# Patient Record
Sex: FEMALE | Race: WHITE | NOT HISPANIC OR LATINO | Employment: FULL TIME
[De-identification: names, ages, dates, MRNs, and addresses within clinical notes are randomized per-mention and may not be internally consistent; named-entity substitution may affect disease eponyms.]

---

## 2017-10-30 DIAGNOSIS — Z30.41 ENCOUNTER FOR SURVEILLANCE OF CONTRACEPTIVE PILLS: ICD-10-CM

## 2017-10-31 RX ORDER — NORETHINDRONE ACETATE AND ETHINYL ESTRADIOL .02; 1 MG/1; MG/1
TABLET ORAL
Qty: 84 TABLET | Refills: 0 | Status: SHIPPED | OUTPATIENT
Start: 2017-10-31 | End: 2017-12-01

## 2017-10-31 NOTE — TELEPHONE ENCOUNTER
Medication is being filled for 1 time refill only due to:  Patient needs to be seen because it has been more than one year since last visit. Needs annual visit prior to anymore refills.

## 2017-11-26 ENCOUNTER — HEALTH MAINTENANCE LETTER (OUTPATIENT)
Age: 47
End: 2017-11-26

## 2017-12-01 ENCOUNTER — OFFICE VISIT (OUTPATIENT)
Dept: FAMILY MEDICINE | Facility: CLINIC | Age: 47
End: 2017-12-01
Payer: COMMERCIAL

## 2017-12-01 VITALS
HEART RATE: 72 BPM | HEIGHT: 62 IN | WEIGHT: 172 LBS | RESPIRATION RATE: 20 BRPM | DIASTOLIC BLOOD PRESSURE: 72 MMHG | BODY MASS INDEX: 31.65 KG/M2 | SYSTOLIC BLOOD PRESSURE: 130 MMHG | TEMPERATURE: 98.8 F

## 2017-12-01 DIAGNOSIS — Z30.41 ENCOUNTER FOR SURVEILLANCE OF CONTRACEPTIVE PILLS: ICD-10-CM

## 2017-12-01 DIAGNOSIS — Z12.4 SCREENING FOR CERVICAL CANCER: ICD-10-CM

## 2017-12-01 DIAGNOSIS — Z00.00 ROUTINE GENERAL MEDICAL EXAMINATION AT A HEALTH CARE FACILITY: Primary | ICD-10-CM

## 2017-12-01 DIAGNOSIS — Z12.31 VISIT FOR SCREENING MAMMOGRAM: ICD-10-CM

## 2017-12-01 PROCEDURE — G0202 SCR MAMMO BI INCL CAD: HCPCS | Mod: TC

## 2017-12-01 PROCEDURE — G0145 SCR C/V CYTO,THINLAYER,RESCR: HCPCS | Performed by: PHYSICIAN ASSISTANT

## 2017-12-01 PROCEDURE — 99396 PREV VISIT EST AGE 40-64: CPT | Performed by: PHYSICIAN ASSISTANT

## 2017-12-01 PROCEDURE — 87624 HPV HI-RISK TYP POOLED RSLT: CPT | Performed by: PHYSICIAN ASSISTANT

## 2017-12-01 RX ORDER — NORETHINDRONE ACETATE AND ETHINYL ESTRADIOL .02; 1 MG/1; MG/1
1 TABLET ORAL DAILY
Qty: 84 TABLET | Refills: 3 | Status: SHIPPED | OUTPATIENT
Start: 2017-12-01 | End: 2020-02-20

## 2017-12-01 NOTE — MR AVS SNAPSHOT
After Visit Summary   12/1/2017    Fiona Pollack    MRN: [id number]           Patient Information     Date Of Birth          1970        Visit Information        Provider Department      12/1/2017 11:00 AM Randal Nice PA-C [facility]        Today's Diagnoses     Routine general medical examination at a health care facility        Screening for cervical cancer        Encounter for surveillance of contraceptive pills          Care Instructions      Preventive Health Recommendations  Female Ages 40 to 49    Yearly exam:     See your health care provider every year in order to  1. Review health changes.   2. Discuss preventive care.    3. Review your medicines if your doctor prescribed any.      Get a Pap test every three years (unless you have an abnormal result and your provider advises testing more often).      If you get Pap tests with HPV test, you only need to test every 5 years, unless you have an abnormal result. You do not need a Pap test if your uterus was removed (hysterectomy) and you have not had cancer.      You should be tested each year for STDs (sexually transmitted diseases), if you're at risk.       Ask your doctor if you should have a mammogram.      Have a colonoscopy (test for colon cancer) if someone in your family has had colon cancer or polyps before age 50.       Have a cholesterol test every 5 years.       Have a diabetes test (fasting glucose) after age 45. If you are at risk for diabetes, you should have this test every 3 years.    Shots: Get a flu shot each year. Get a tetanus shot every 10 years.     Nutrition:     Eat at least 5 servings of fruits and vegetables each day.    Eat whole-grain bread, whole-wheat pasta and brown rice instead of white grains and rice.    Talk to your provider about Calcium and Vitamin D.     Lifestyle    Exercise at least 150 minutes a week (an average of 30 minutes a day, 5 days a week). This will help you  control your weight and prevent disease.    Limit alcohol to one drink per day.    No smoking.     Wear sunscreen to prevent skin cancer.    See your dentist every six months for an exam and cleaning.          Follow-ups after your visit        Follow-up notes from your care team     Return in about 1 year (around 12/1/2018) for Physical Exam.      Your next 10 appointments already scheduled     Dec 01, 2017  2:45 PM CST   MAMMOGRAM with CRMA1   [facility]    [address] [place] [id number] [phone]           Do not wear any body powder, lotions, deodorant or perfume the day of the exam. Bring a list of all medications, especially hormones.  If your last mammogram was not done at [facility], please bring your mammogram films. We will need the name of your MD/PA to send a copy of your report.              Who to contact     If you have questions or need follow up information about today's clinic visit or your schedule please contact [facility] directly at [phone].  Normal or non-critical lab and imaging results will be communicated to you by POPSUGARhart, letter or phone within 4 business days after the clinic has received the results. If you do not hear from us within 7 days, please contact the clinic through Kiiot or phone. If you have a critical or abnormal lab result, we will notify you by phone as soon as possible.  Submit refill requests through PeopleGoal or call your pharmacy and they will forward the refill request to us. Please allow 3 business days for your refill to be completed.          Additional Information About Your Visit        PeopleGoal Information     PeopleGoal gives you secure access to your electronic health record. If you see a primary care provider, you can also send messages to your care team and make appointments. If you have questions, please call your primary care clinic.  If you do not have a primary  "care provider, please call [phone] and they will assist you.        Care EveryWhere ID     This is your Care EveryWhere ID. This could be used by other organizations to access your [facility] medical records  [id number]        Your Vitals Were     Pulse Temperature Respirations Height Last Period Breastfeeding?    72 98.8  F (37.1  C) (Oral) 20 5' 2\" (1.575 m) 11/20/2017 (Exact Date) No    BMI (Body Mass Index)                   31.46 kg/m2            Blood Pressure from Last 3 Encounters:   12/01/17 130/72   10/26/16 120/68   10/02/15 118/72    Weight from Last 3 Encounters:   12/01/17 172 lb (78 kg)   10/26/16 174 lb (78.9 kg)   10/02/15 160 lb (72.6 kg)              We Performed the Following     HPV High Risk Types DNA Cervical     Pap imaged thin layer screen with HPV - recommended age 30 - 65 years (select HPV order below)          Today's Medication Changes          These changes are accurate as of: 12/1/17 11:29 AM.  If you have any questions, ask your nurse or doctor.               These medicines have changed or have updated prescriptions.        Dose/Directions    norethindrone-ethinyl estradiol 1-20 MG-MCG per tablet   Commonly known as:  MICROGESTIN 1/20   This may have changed:  See the new instructions.   Used for:  Encounter for surveillance of contraceptive pills   Changed by:  Randal Nice PA-C        Dose:  1 tablet   Take 1 tablet by mouth daily   Quantity:  84 tablet   Refills:  3            Where to get your medicines      These medications were sent to [facility]/pharmacy #[id number] - [place], [place] - [address]  [address]  [facility] [address]     Phone:  [phone]     norethindrone-ethinyl estradiol 1-20 MG-MCG per tablet                Primary Care Provider Office Phone # Fax #    [facility] [phone]       [id number]  [address]  [facility] [id number]        Equal Access to Services     CARMELINA RUBALCAVA AH: daisy Mckinney " be dooleyrudimax prestonmanasa watson. So [facility] [phone].    ATENCIÓN: Si zoila ayers, tiene a nagy disposición servicios gratuitos de asistencia lingüística. Rachidame al [phone].    We comply with applicable federal civil rights laws and [place] laws. We do not discriminate on the basis of race, color, national origin, age, disability, sex, sexual orientation, or gender identity.            Thank you!     Thank you for choosing [facility]  for your care. Our goal is always to provide you with excellent care. Hearing back from our patients is one way we can continue to improve our services. Please take a few minutes to complete the written survey that you may receive in the mail after your visit with us. Thank you!             Your Updated Medication List - Protect others around you: Learn how to safely use, store and throw away your medicines at www.disposemymeds.org.          This list is accurate as of: 12/1/17 11:29 AM.  Always use your most recent med list.                   Brand Name Dispense Instructions for use Diagnosis    norethindrone-ethinyl estradiol 1-20 MG-MCG per tablet    MICROGESTIN 1/20    84 tablet    Take 1 tablet by mouth daily    Encounter for surveillance of contraceptive pills

## 2017-12-01 NOTE — PROGRESS NOTES
SUBJECTIVE:   CC: Fiona Pollack is an 47 year old woman who presents for preventive health visit.     Physical   Annual:     Getting at least 3 servings of Calcium per day::  Yes    Bi-annual eye exam::  Yes    Dental care twice a year::  Yes    Sleep apnea or symptoms of sleep apnea::  None    Diet::  Regular (no restrictions)    Frequency of exercise::  2-3 days/week    Duration of exercise::  15-30 minutes    Taking medications regularly::  Yes    Medication side effects::  None    Additional concerns today::  No    Needs repeat PAP, last year pap normal but +HPV.  No other concerns.  Refill OCPs.    Today's PHQ-2 Score:   PHQ-2 ( 1999 Pf[employer]) 12/1/2017   Q1: Little interest or pleasure in doing things 0   Q2: Feeling down, depressed or hopeless 0   PHQ-2 Score 0   Q1: Little interest or pleasure in doing things Not at all   Q2: Feeling down, depressed or hopeless Not at all   PHQ-2 Score 0       Abuse: Current or Past(Physical, Sexual or Emotional)- No  Do you feel safe in your environment - Yes    Social History   Substance Use Topics     Smoking status: Never Smoker     Smokeless tobacco: Never Used     Alcohol use 1.2 oz/week     2 Glasses of wine per week      Comment: occ     The patient does not drink >3 drinks per day nor >7 drinks per week.    Reviewed orders with patient.  Reviewed health maintenance and updated orders accordingly - Yes  Labs reviewed in EPIC    Patient under age 50, mutual decision reflected in health maintenance.  Has mammogram set up for later today.  Mom and maternal grandmother with breast cancer.  Pertinent mammograms are reviewed under the imaging tab.    History of abnormal Pap smear:   Last 3 Pap and HPV Results:   PAP / HPV Latest Ref Rng & Units 10/26/2016 1/17/2014   PAP - NIL NIL   HPV 16 DNA NEG Negative -   HPV 18 DNA NEG Negative -   OTHER HR HPV NEG Positive(A) -       Reviewed and updated as needed this visit by clinical staffTobacco  Allergies  Meds  Problems  " Med Hx  Surg Hx  Fam Hx  Soc Hx          Reviewed and updated as needed this visit by Provider          Review of Systems  C: NEGATIVE for fever, chills, change in weight  I: NEGATIVE for worrisome rashes, moles or lesions  E: NEGATIVE for vision changes or irritation  ENT: NEGATIVE for ear, mouth and throat problems  R: NEGATIVE for significant cough or SOB  B: NEGATIVE for masses, tenderness or discharge  CV: NEGATIVE for chest pain, palpitations or peripheral edema  GI: NEGATIVE for nausea, abdominal pain, heartburn, or change in bowel habits  : NEGATIVE for unusual urinary or vaginal symptoms. Periods are regular.  M: NEGATIVE for significant arthralgias or myalgia  N: NEGATIVE for weakness, dizziness or paresthesias  P: NEGATIVE for changes in mood or affect     OBJECTIVE:   /72 (BP Location: Right arm, Patient Position: Sitting, Cuff Size: Adult Regular)  Pulse 72  Temp 98.8  F (37.1  C) (Oral)  Resp 20  Ht 5' 2\" (1.575 m)  Wt 172 lb (78 kg)  LMP 11/20/2017 (Exact Date)  Breastfeeding? No  BMI 31.46 kg/m2  Physical Exam  GENERAL: healthy, alert and no distress  EYES: Eyes grossly normal to inspection, PERRL and conjunctivae and sclerae normal  HENT: ear canals and TM's normal, nose and mouth without ulcers or lesions  NECK: no adenopathy, no asymmetry, masses, or scars and thyroid nodule noted on right  RESP: lungs clear to auscultation - no rales, rhonchi or wheezes  BREAST: normal without masses, tenderness or nipple discharge and no palpable axillary masses or adenopathy  CV: regular rate and rhythm, normal S1 S2, no S3 or S4, no murmur, click or rub, no peripheral edema and peripheral pulses strong  ABDOMEN: soft, nontender, no hepatosplenomegaly, no masses and bowel sounds normal   (female): normal female external genitalia, normal urethral meatus, vaginal mucosa pink, moist, well rugated, and normal cervix/adnexa/uterus without masses or discharge, menstrual spotting present " "today.  MS: no gross musculoskeletal defects noted, no edema  SKIN: no suspicious lesions or rashes  NEURO: Normal strength and tone, mentation intact and speech normal  PSYCH: mentation appears normal, affect normal/bright    ASSESSMENT/PLAN:   1. Routine general medical examination at a health care facility  - labs again next year.    2. Screening for cervical cancer    - Pap imaged thin layer screen with HPV - recommended age 30 - 65 years (select HPV order below)  - HPV High Risk Types DNA Cervical    3. Encounter for surveillance of contraceptive pills  Refilled.  - norethindrone-ethinyl estradiol (MICROGESTIN 1/20) 1-20 MG-MCG per tablet; Take 1 tablet by mouth daily  Dispense: 84 tablet; Refill: 3            COUNSELING:  Reviewed preventive health counseling, as reflected in patient instructions    BP Screening:   Last 3 BP Readings:    BP Readings from Last 3 Encounters:   12/01/17 130/72   10/26/16 120/68   10/02/15 118/72       The following was recommended to the patient:  Re-screen BP within a year and recommended lifestyle modifications     reports that she has never smoked. She has never used smokeless tobacco.    Estimated body mass index is 31.46 kg/(m^2) as calculated from the following:    Height as of this encounter: 5' 2\" (1.575 m).    Weight as of this encounter: 172 lb (78 kg).     Weight management plan: Discussed healthy diet and exercise guidelines and patient will follow up in 12 months in clinic to re-evaluate.    Counseling Resources:  ATP IV Guidelines  Pooled Cohorts Equation Calculator  Breast Cancer Risk Calculator  FRAX Risk Assessment  ICSI Preventive Guidelines  Dietary Guidelines for Americans, 2010  USDA's MyPlate  ASA Prophylaxis  Lung CA Screening    Randal Nice PA-C  [facility]  "

## 2017-12-05 LAB
COPATH REPORT: NORMAL
PAP: NORMAL

## 2017-12-07 LAB
FINAL DIAGNOSIS: NORMAL
HPV HR 12 DNA CVX QL NAA+PROBE: NEGATIVE
HPV16 DNA SPEC QL NAA+PROBE: NEGATIVE
HPV18 DNA SPEC QL NAA+PROBE: NEGATIVE
SPECIMEN DESCRIPTION: NORMAL

## 2017-12-18 ENCOUNTER — TRANSFERRED RECORDS (OUTPATIENT)
Dept: HEALTH INFORMATION MANAGEMENT | Facility: CLINIC | Age: 47
End: 2017-12-18

## 2018-10-04 ENCOUNTER — OFFICE VISIT (OUTPATIENT)
Dept: ENDOCRINOLOGY | Facility: CLINIC | Age: 48
End: 2018-10-04
Payer: COMMERCIAL

## 2018-10-04 VITALS
WEIGHT: 174 LBS | SYSTOLIC BLOOD PRESSURE: 122 MMHG | HEART RATE: 82 BPM | DIASTOLIC BLOOD PRESSURE: 76 MMHG | TEMPERATURE: 98.2 F | BODY MASS INDEX: 31.83 KG/M2 | OXYGEN SATURATION: 100 %

## 2018-10-04 DIAGNOSIS — Z86.2 HISTORY OF IRON DEFICIENCY ANEMIA: ICD-10-CM

## 2018-10-04 DIAGNOSIS — E03.8 SUBCLINICAL HYPOTHYROIDISM: ICD-10-CM

## 2018-10-04 DIAGNOSIS — E04.1 THYROID NODULE: Primary | ICD-10-CM

## 2018-10-04 DIAGNOSIS — R53.83 FATIGUE, UNSPECIFIED TYPE: ICD-10-CM

## 2018-10-04 LAB
ALBUMIN SERPL-MCNC: 3.7 G/DL (ref 3.4–5)
ALP SERPL-CCNC: 52 U/L (ref 40–150)
ALT SERPL W P-5'-P-CCNC: 44 U/L (ref 0–50)
ANION GAP SERPL CALCULATED.3IONS-SCNC: 6 MMOL/L (ref 3–14)
AST SERPL W P-5'-P-CCNC: 28 U/L (ref 0–45)
BILIRUB SERPL-MCNC: 0.6 MG/DL (ref 0.2–1.3)
BUN SERPL-MCNC: 9 MG/DL (ref 7–30)
CALCIUM SERPL-MCNC: 8.5 MG/DL (ref 8.5–10.1)
CHLORIDE SERPL-SCNC: 103 MMOL/L (ref 94–109)
CO2 SERPL-SCNC: 28 MMOL/L (ref 20–32)
CREAT SERPL-MCNC: 0.84 MG/DL (ref 0.52–1.04)
ERYTHROCYTE [DISTWIDTH] IN BLOOD BY AUTOMATED COUNT: 13.1 % (ref 10–15)
FERRITIN SERPL-MCNC: 30 NG/ML (ref 8–252)
GFR SERPL CREATININE-BSD FRML MDRD: 72 ML/MIN/1.7M2
GLUCOSE SERPL-MCNC: 87 MG/DL (ref 70–99)
HCT VFR BLD AUTO: 42 % (ref 35–47)
HGB BLD-MCNC: 13.6 G/DL (ref 11.7–15.7)
IRON SATN MFR SERPL: 24 % (ref 15–46)
IRON SERPL-MCNC: 85 UG/DL (ref 35–180)
MCH RBC QN AUTO: 30.2 PG (ref 26.5–33)
MCHC RBC AUTO-ENTMCNC: 32.4 G/DL (ref 31.5–36.5)
MCV RBC AUTO: 93 FL (ref 78–100)
PLATELET # BLD AUTO: 299 10E9/L (ref 150–450)
POTASSIUM SERPL-SCNC: 3.9 MMOL/L (ref 3.4–5.3)
PROT SERPL-MCNC: 7.3 G/DL (ref 6.8–8.8)
RBC # BLD AUTO: 4.5 10E12/L (ref 3.8–5.2)
SODIUM SERPL-SCNC: 137 MMOL/L (ref 133–144)
T3FREE SERPL-MCNC: 2.6 PG/ML (ref 2.3–4.2)
T4 FREE SERPL-MCNC: 0.85 NG/DL (ref 0.76–1.46)
TIBC SERPL-MCNC: 352 UG/DL (ref 240–430)
TSH SERPL DL<=0.005 MIU/L-ACNC: 3.22 MU/L (ref 0.4–4)
WBC # BLD AUTO: 5.3 10E9/L (ref 4–11)

## 2018-10-04 PROCEDURE — 84481 FREE ASSAY (FT-3): CPT | Performed by: CLINICAL NURSE SPECIALIST

## 2018-10-04 PROCEDURE — 82306 VITAMIN D 25 HYDROXY: CPT | Performed by: CLINICAL NURSE SPECIALIST

## 2018-10-04 PROCEDURE — 36415 COLL VENOUS BLD VENIPUNCTURE: CPT | Performed by: CLINICAL NURSE SPECIALIST

## 2018-10-04 PROCEDURE — 82728 ASSAY OF FERRITIN: CPT | Performed by: CLINICAL NURSE SPECIALIST

## 2018-10-04 PROCEDURE — 86800 THYROGLOBULIN ANTIBODY: CPT | Performed by: CLINICAL NURSE SPECIALIST

## 2018-10-04 PROCEDURE — 83540 ASSAY OF IRON: CPT | Performed by: CLINICAL NURSE SPECIALIST

## 2018-10-04 PROCEDURE — 99408 AUDIT/DAST 15-30 MIN: CPT | Performed by: CLINICAL NURSE SPECIALIST

## 2018-10-04 PROCEDURE — 85027 COMPLETE CBC AUTOMATED: CPT | Performed by: CLINICAL NURSE SPECIALIST

## 2018-10-04 PROCEDURE — 86376 MICROSOMAL ANTIBODY EACH: CPT | Performed by: CLINICAL NURSE SPECIALIST

## 2018-10-04 PROCEDURE — 80053 COMPREHEN METABOLIC PANEL: CPT | Performed by: CLINICAL NURSE SPECIALIST

## 2018-10-04 PROCEDURE — 83550 IRON BINDING TEST: CPT | Performed by: CLINICAL NURSE SPECIALIST

## 2018-10-04 PROCEDURE — 84443 ASSAY THYROID STIM HORMONE: CPT | Performed by: CLINICAL NURSE SPECIALIST

## 2018-10-04 PROCEDURE — 84439 ASSAY OF FREE THYROXINE: CPT | Performed by: CLINICAL NURSE SPECIALIST

## 2018-10-04 NOTE — PROGRESS NOTES
Name: Fiona Pollack  F/u for thyroid nodule (Last seen 4/3/2015).   HPI:  Fiona Pollack is a 47 year old female who presents for the evaluation/managment of thyroid nodules. She has a history of thyroid nodules first noted in 2009.  A previous FNA biopsy was done 6/15/2009 of the dominant right thyroid nodule and was benign.  She previously saw endo at the [facility] but has not followed up there for quite some time due to distance from her home and work.  Thyroid ultrasound done at [facility] (scanned into the chart) showed: thyromegaly with multiple small nodules and reported that two of the nodules had increased in size since prior exam.  FNA biopsy of the two largest right sided nodules was done 12/30/2014 and results were benign.    Started Levothyroxine 50 mcg daily in 2015.    She did not note any benefit or improvement in her symptoms so she stopped taking the levothyroxine sometime in 2015.    She continues to note hroat irritation and sensation of pressure in the lower neck area that comes and goes.  Menses are still irregular.   Fatigue is significantly worse the past several months.  No identifiable cause for the fatigue.    She reports adequate sleep but awakens feeling tired.  Having a difficult time staying awake during the day and sometimes needs to take a nap during work hours.    History of radiation exposure: NO  History of thyroid dysfunction: YES, known thyroid nodules since 2009  Dysphagia or Shortness of breath:No   Muscle aches or pain:No  Difficulty sleeping:No  Changes in weight: Yes, weight gain since last seen, 160-->174 lbs.  PMH/PSH:  Past Medical History:   Diagnosis Date     Cervical high risk HPV (human papillomavirus) test positive 10/26/16    (not 16 or 18)     Subclinical hypothyroidism      Thyroid nodule      Past Surgical History:   Procedure Laterality Date     DENTAL SURGERY  1994    wisdom teeth     FOOT SURGERY Left 1995    [facility]  Hx:  Family History   Problem Relation Age of Onset     Cancer Mother 58     lung and brain cancer     Breast Cancer Mother      HEART DISEASE Father      Hypertension Father      Lipids Father      Alzheimer Disease Father      Thyroid disease: No         DM2: No         Autoimmune: DM1, SLE, RA, Vitiligo Yes - paternal grandmother, paternal aunt, paternal uncles with RA.    Social Hx:  Social History     Social History     Marital status:      Spouse name: N/A     Number of children: N/A     Years of education: N/A     Occupational History     Not on file.     Social History Main Topics     Smoking status: Never Smoker     Smokeless tobacco: Never Used     Alcohol use 1.2 oz/week     2 Glasses of wine per week      Comment: occ     Drug use: No     Sexual activity: Yes     Partners: Male     Birth control/ protection: Pill     Other Topics Concern     Parent/Sibling W/ Cabg, Mi Or Angioplasty Before 65f 55m? Yes     Social History Narrative          MEDICATIONS:  has a current medication list which includes the following prescription(s): norethindrone-ethinyl estradiol.    Review of Systems  0 point ROS neg other than the symptoms noted above in the HPI.    Physical Exam   VS: /76 (BP Location: Left arm, Patient Position: Chair, Cuff Size: Adult Regular)  Pulse 82  Temp 98.2  F (36.8  C) (Oral)  Wt 78.9 kg (174 lb)  LMP 09/27/2018 (Exact Date)  SpO2 100%  Breastfeeding? No  BMI 31.83 kg/m2  GENERAL: NAD, well dressed, answering questions appropriately, appears stated age.  HEENT: no exophthalmos, no proptosis, no lig lag, no retraction, no scleral icterus  NECK:  Supple, thyroid gland enlarged and nodular, no tenderness with palpation, no adenopathy  RESPIRATORY: Clear bilaterally.  Normal respiratory effort.  CARDIOVASCULAR: RRR.  No peripheral edema.  EXTREMITIES: no edema  NEUROLOGY: CN grossly intact, no tremors  MSK: grossly intact, No digital cyanosis. Normal gait and station.  PSYCH:  Intact judgment and insight. A&OX3 with a cordial affect.    LABS:  TFTs:  !THYROID Latest Ref Rng & Units 10/26/2016 10/2/2015 4/3/2015   TSH 0.40 - 5.00 mU/L 4.80 2.32 1.70   T4 FREE 0.76 - 1.46 ng/dL   1.16     !THYROID Latest Ref Rng & Units 12/15/2014   TSH 0.40 - 5.00 mU/L 5.54 (H)   T4 FREE 0.76 - 1.46 ng/dL 0.90     Component    Latest Ref Rng 12/15/2014   Thyroglobulin Antibody    <40 IU/mL <20   Thyroid Peroxidase Antibody    <35 IU/mL <10       Thyroid Ultrasound:  Thyroid ultrasound 5/21/2009.    HISTORY: Goiter. Hashimoto's thyroiditis.    COMPARISON: None.    FINDINGS: The isthmus measures 3 mm in thickness. The right lobe  measures 5.4 x 2.6 x 2.3 cm. The left lobe measures 4.9 x 1.8 x 1.4  cm. Both lobes demonstrate diffuse heterogeneous echogenicity.     In the superior right lobe there is a predominantly cystic nodule  measuring 7 x 7 x 6 mm. In the mid and inferior lobe there are 2  nodules that are hyperechoic with hypoechoic rim measuring 1.9 x 1.3 x  1.1 cm and 0.9 x 0.7 x 0.6 cm, respectively.     In the left lobe there is a 6 x 6 x 5 cm hyperechoic nodule with  hypoechoic rim.    IMPRESSION: Heterogeneous multinodular appearance of the thyroid  gland. Dominant nodule in the mid right lobe measures 1.9 cm in  greatest dimension.    Most recent thyroid ultrasound :  12/5/2014, [facility], results scanned into chart.  Right lobe 5.3 x 1.9 x 2.7 cm.  Left lobe 4.9 x 1.5 x 1.8 cm.  Total of 5 nodules with the largest measuring 2.3 cm in right lobe.  Second largest nodule is 1.1 cm. 3/5 nodules are subcentimeter.    FNA biopsy:  ultrasound guided fine needle aspiration  of nodule in the right lobe of the thyroid gland.        Patient Name: LIZZY MENDES  MR#: [id number]  Collected: 6/15/2009    SPECIMEN/STAIN PROCESS:  FNA-thyroid-RT  Pap-Cyto x 9, Diff Quick Stain-cyto x 9    ----------------------------------------------------------------    CYTOLOGIC INTERPRETATION:    FNA-thyroid-RT:  "Negative for Malignancy  consistent with benign colloid nodule.  Specimen Adequacy: Satisfactory for evaluation.      FNA Biopsy: (12/30/2014)  SPECIMEN/STAIN PROCESS:  A: FNA-thyroid, larger right nodule  Pap-Cyto x 6, Mock's stain-cyto x 3  B: FNA-thyroid, smaller right nodule  Pap-Cyto x 6, Mock's stain-cyto x 3    ----------------------------------------------------------------    CYTOLOGIC INTERPRETATION:    A. FNA-thyroid, larger right nodule: Benign  Consistent with a benign nodule (includes adenomatoid nodule, colloid  nodule, etc.)      The [place] Implied Risk of Malignancy and Recommended Clinical  Management:  Benign has a 0-3% risk of malignancy, recommended management is clinical  follow-up    Specimen Adequacy: Satisfactory for evaluation but limited by:  -scant cellularity.    B. FNA-thyroid, smaller right nodule: Benign  -Consistent with a benign nodule (includes adenomatoid nodule, colloid  nodule, etc.)  -Features suggestive of associated chronic thyroiditis, Hashimoto's  type.    All pertinent notes, labs, and images personally reviewed by me.     A/P  Ms.Staci FCO Pollack is a 47 year old here for the evaluation of thyroid nodule:    #1 Thyroid Nodules.  Thyroid ultrasound ([facility]) showed several thyroid nodules; all of the nodules but two were < 1 cm.  Two right nodules measuring 2.3 x 1.2 x 1.8 cm and 1.1 x 1.0 x 1.1 cm, both of which had increased \"slightly\" since the prior exam.   Recent FNA biopsy of the two largest right nodules benign.  Denies any obstructive symptoms - no trouble swallowing or breathing.  Plan to repeat thyroid ultrasound.    Thyroid nodules are common and are frequently benign. Data suggest that the prevalence of palpable thyroid nodules is 3% to 7% in [place]; the prevalence is as high as 50% based on ultrasonography (US) or autopsy data. All patients with a palpable thyroid nodule, however, should undergo US examination. US-guided FNA (US-FNA) is " recommended for nodules ?10 mm; US-FNA is suggested for nodules <10 mm only if clinical information or US features are suspicious.    Causes of thyroid Nodules: Benign (Multinodular goiter, Hashimoto s thyroiditis, Simple or hemorrhagic cysts, Follicular adenomas, Subacute thyroiditis) or Malignant(Papillary carcinoma, Follicular carcinoma, Hürthle cell carcinoma, Medullary carcinoma, Anaplastic carcinoma, Primary thyroid lymphoma, or Metastatic malignant lesion).    MultiNodule:  The risk of cancer is not significantly higher in palpable solitary thyroid nodules than in multinodular lesions or in nodules in diffuse goiters. In multinodular thyroid glands, the cytologic sampling should be focused on lesions characterized by suspicious US features rather than on larger or clinically dominant nodules.    Cyst:  Most complex thyroid nodules with a dominant fluid component are benign. USFNA, however, should always be performed because the rare papillary thyroid carcinoma (PTC) can be cystic. An unsatisfactory (nondiagnostic) specimen usually results from a cystic nodule that yields few or no follicular cells. Reaspiration yields satisfactory results in 50% of cases.    Fine-Needle Aspiration Cytologic Diagnosis: 70% of FNA specimens are classified as benign; in addition, 5% are malignant, 10% are suspicious, and 10% to 20% are nondiagnostic or unsatisfactory. At surgical intervention, about 20% of such indeterminate/suspicious specimens are found to be malignant lesions.    Despite good initial technique, repeated biopsy, and US-FNA, approximately 5% of nodules still remain nondiagnostic. Such thyroid nodules should be surgically excised.    2.  Subclinical hypothyroidism - TSH previously elevated with low-normal free T3 and low normal free  T4.  Antithyroglobulin and TPO antibodies were not elevated.  + symptoms consistent with hypothyroidism.  Briefly treated with levothyroxine - stopped on her own sometime in 2015  due to lack of perceived benefit.  Now noting significant fatigue - no identifiable cause.  Will obtain TFT's and recheck thyroid antibodies.    Other.  History of iron deficient anemia and previous history of vitamin D deficiency.  Will obtain additional labs for further evaluation of fatigue.    Labs ordered today:   Orders Placed This Encounter   Procedures     US Head Neck Soft Tissue     TSH     T4 FREE     Vitamin D Deficiency     Ferritin     Iron and iron binding capacity     CBC with platelets     Comprehensive metabolic panel     Thyroid peroxidase antibody     Anti thyroglobulin antibody     T3, Free     Radiology/Consults ordered today: US HEAD NECK SOFT TISSUE    More than 50% of the time spent with Ms. Pollack on counseling / coordinating her care.Total face to face time was 20 minutes.      Follow-up:  follow up to be determined based on labs    Anastacia Drake NP  Endocrinology  [facility]  CC:  Gabriele Majano Ayesha

## 2018-10-04 NOTE — MR AVS SNAPSHOT
After Visit Summary   10/4/2018    Fiona Pollack    MRN: [id number]           Patient Information     Date Of Birth          1970        Visit Information        Provider Department      10/4/2018 2:00 PM Anastacia Drake APRN CNP [facility]        Today's Diagnoses     Thyroid nodule    -  1    Subclinical hypothyroidism        Fatigue, unspecified type        History of iron deficiency anemia           Follow-ups after your visit        Future tests that were ordered for you today     Open Future Orders        Priority Expected Expires Ordered    US Head Neck Soft Tissue Routine 1/2/2019 4/2/2019 10/4/2018            Who to contact     If you have questions or need follow up information about today's clinic visit or your schedule please contact [facility] directly at [phone].  Normal or non-critical lab and imaging results will be communicated to you by OneRiothart, letter or phone within 4 business days after the clinic has received the results. If you do not hear from us within 7 days, please contact the clinic through MyChart or phone. If you have a critical or abnormal lab result, we will notify you by phone as soon as possible.  Submit refill requests through [employer] or call your pharmacy and they will forward the refill request to us. Please allow 3 business days for your refill to be completed.          Additional Information About Your Visit        MyChart Information     [employer] gives you secure access to your electronic health record. If you see a primary care provider, you can also send messages to your care team and make appointments. If you have questions, please call your primary care clinic.  If you do not have a primary care provider, please call [phone] and they will assist you.        Care EveryWhere ID     This is your Care EveryWhere ID. This could be used by other organizations to access your [facility]  records  [id number]        Your Vitals Were     Pulse Temperature Last Period Pulse Oximetry Breastfeeding? BMI (Body Mass Index)    82 98.2  F (36.8  C) (Oral) 09/27/2018 (Exact Date) 100% No 31.83 kg/m2       Blood Pressure from Last 3 Encounters:   10/04/18 122/76   12/01/17 130/72   10/26/16 120/68    Weight from Last 3 Encounters:   10/04/18 78.9 kg (174 lb)   12/01/17 78 kg (172 lb)   10/26/16 78.9 kg (174 lb)              We Performed the Following     Anti thyroglobulin antibody     CBC with platelets     Comprehensive metabolic panel     Ferritin     Iron and iron binding capacity     T4 FREE     Thyroid peroxidase antibody     Triiodothyronine Free Serum ([facility])     TSH     Vitamin D Deficiency        Primary Care Provider Office Phone # Fax #    [facility] [phone] [facility] [address]        Equal Access to Services     CARMELINA RUBALCAVA : Hadii aad ku hadasho Soomaali, waaxda luqadaha, qaybta kaalmada adeegyada, manasa manzo . So [facility] [phone].    ATENCIÓN: Si habla español, tiene a nagy disposición servicios gratuitos de asistencia lingüística. Llame al [phone].    We comply with applicable federal civil rights laws and [place] laws. We do not discriminate on the basis of race, color, national origin, age, disability, sex, sexual orientation, or gender identity.            Thank you!     Thank you for choosing [facility]  for your care. Our goal is always to provide you with excellent care. Hearing back from our patients is one way we can continue to improve our services. Please take a few minutes to complete the written survey that you may receive in the mail after your visit with us. Thank you!             Your Updated Medication List - Protect others around you: Learn how to safely use, store and throw away your medicines at www.disposemymeds.org.          This list is accurate as of  10/4/18  2:21 PM.  Always use your most recent med list.                   Brand Name Dispense Instructions for use Diagnosis    norethindrone-ethinyl estradiol 1-20 MG-MCG per tablet    MICROGESTIN 1/20    84 tablet    Take 1 tablet by mouth daily    Encounter for surveillance of contraceptive pills

## 2018-10-04 NOTE — LETTER
10/4/2018         RE: Fiona Pollack  [address]  [place] [address]        Dear Colleague,    Thank you for referring your patient, Fiona Pollack, to the [facility]. Please see a copy of my visit note below.    Name: Fiona Pollack  F/u for thyroid nodule (Last seen 4/3/2015).   HPI:  Fiona Pollack is a 47 year old female who presents for the evaluation/managment of thyroid nodules. She has a history of thyroid nodules first noted in 2009.  A previous FNA biopsy was done 6/15/2009 of the dominant right thyroid nodule and was benign.  She previously saw endo at the [facility] but has not followed up there for quite some time due to distance from her home and work.  [facility] (scanned into the chart) showed: thyromegaly with multiple small nodules and reported that two of the nodules had increased in size since prior exam.  FNA biopsy of the two largest right sided nodules was done 12/30/2014 and results were benign.    Started Levothyroxine 50 mcg daily in 2015.    She did not note any benefit or improvement in her symptoms so she stopped taking the levothyroxine sometime in 2015.    She continues to note hroat irritation and sensation of pressure in the lower neck area that comes and goes.  Menses are still irregular.   Fatigue is significantly worse the past several months.  No identifiable cause for the fatigue.    She reports adequate sleep but awakens feeling tired.  Having a difficult time staying awake during the day and sometimes needs to take a nap during work hours.    History of radiation exposure: NO  History of thyroid dysfunction: YES, known thyroid nodules since 2009  Dysphagia or Shortness of breath:No   Muscle aches or pain:No  Difficulty sleeping:No  Changes in weight: Yes, weight gain since last seen, 160-->174 lbs.  PMH/PSH:  Past Medical History:   Diagnosis Date     Cervical high risk HPV (human  papillomavirus) test positive 10/26/16    (not 16 or 18)     Subclinical hypothyroidism      Thyroid nodule      Past Surgical History:   Procedure Laterality Date     DENTAL SURGERY  1994    wisdom teeth     FOOT SURGERY Left 1995    Bunion     Family Hx:  Family History   Problem Relation Age of Onset     Cancer Mother 58     lung and brain cancer     Breast Cancer Mother      HEART DISEASE Father      Hypertension Father      Lipids Father      Alzheimer Disease Father      Thyroid disease: No         DM2: No         Autoimmune: DM1, SLE, RA, Vitiligo Yes - paternal grandmother, paternal aunt, paternal uncles with RA.    Social Hx:  Social History     Social History     Marital status:      Spouse name: N/A     Number of children: N/A     Years of education: N/A     Occupational History     Not on file.     Social History Main Topics     Smoking status: Never Smoker     Smokeless tobacco: Never Used     Alcohol use 1.2 oz/week     2 Glasses of wine per week      Comment: occ     Drug use: No     Sexual activity: Yes     Partners: Male     Birth control/ protection: Pill     Other Topics Concern     Parent/Sibling W/ Cabg, Mi Or Angioplasty Before 65f 55m? Yes     Social History Narrative          MEDICATIONS:  has a current medication list which includes the following prescription(s): norethindrone-ethinyl estradiol.    Review of Systems  0 point ROS neg other than the symptoms noted above in the HPI.    Physical Exam   VS: /76 (BP Location: Left arm, Patient Position: Chair, Cuff Size: Adult Regular)  Pulse 82  Temp 98.2  F (36.8  C) (Oral)  Wt 78.9 kg (174 lb)  LMP 09/27/2018 (Exact Date)  SpO2 100%  Breastfeeding? No  BMI 31.83 kg/m2  GENERAL: NAD, well dressed, answering questions appropriately, appears stated age.  HEENT: no exophthalmos, no proptosis, no lig lag, no retraction, no scleral icterus  NECK:  Supple, thyroid gland enlarged and nodular, no tenderness with palpation, no  adenopathy  RESPIRATORY: Clear bilaterally.  Normal respiratory effort.  CARDIOVASCULAR: RRR.  No peripheral edema.  EXTREMITIES: no edema  NEUROLOGY: CN grossly intact, no tremors  MSK: grossly intact, No digital cyanosis. Normal gait and station.  PSYCH: Intact judgment and insight. A&OX3 with a cordial affect.    LABS:  TFTs:  !THYROID Latest Ref Rng & Units 10/26/2016 10/2/2015 4/3/2015   TSH 0.40 - 5.00 mU/L 4.80 2.32 1.70   T4 FREE 0.76 - 1.46 ng/dL   1.16     !THYROID Latest Ref Rng & Units 12/15/2014   TSH 0.40 - 5.00 mU/L 5.54 (H)   T4 FREE 0.76 - 1.46 ng/dL 0.90     Component    Latest Ref Rng 12/15/2014   Thyroglobulin Antibody    <40 IU/mL <20   Thyroid Peroxidase Antibody    <35 IU/mL <10       Thyroid Ultrasound:  Thyroid ultrasound 5/21/2009.    HISTORY: Goiter. Hashimoto's thyroiditis.    COMPARISON: None.    FINDINGS: The isthmus measures 3 mm in thickness. The right lobe  measures 5.4 x 2.6 x 2.3 cm. The left lobe measures 4.9 x 1.8 x 1.4  cm. Both lobes demonstrate diffuse heterogeneous echogenicity.     In the superior right lobe there is a predominantly cystic nodule  measuring 7 x 7 x 6 mm. In the mid and inferior lobe there are 2  nodules that are hyperechoic with hypoechoic rim measuring 1.9 x 1.3 x  1.1 cm and 0.9 x 0.7 x 0.6 cm, respectively.     In the left lobe there is a 6 x 6 x 5 cm hyperechoic nodule with  hypoechoic rim.    IMPRESSION: Heterogeneous multinodular appearance of the thyroid  gland. Dominant nodule in the mid right lobe measures 1.9 cm in  greatest dimension.    Most recent thyroid ultrasound :  [facility], results scanned into chart.  Right lobe 5.3 x 1.9 x 2.7 cm.  Left lobe 4.9 x 1.5 x 1.8 cm.  Total of 5 nodules with the largest measuring 2.3 cm in right lobe.  Second largest nodule is 1.1 cm. 3/5 nodules are subcentimeter.    FNA biopsy:  ultrasound guided fine needle aspiration  of nodule in the right lobe of the thyroid gland.        Patient Name:  "LIZZY POLLACK  MR#: [id number]  Collected: 6/15/2009    SPECIMEN/STAIN PROCESS:  FNA-thyroid-RT  Pap-Cyto x 9, Diff Quick Stain-cyto x 9    ----------------------------------------------------------------    CYTOLOGIC INTERPRETATION:    FNA-thyroid-RT: Negative for Malignancy  consistent with benign colloid nodule.  Specimen Adequacy: Satisfactory for evaluation.      FNA Biopsy: (12/30/2014)  SPECIMEN/STAIN PROCESS:  A: FNA-thyroid, larger right nodule  Pap-Cyto x 6, Mock's stain-cyto x 3  B: FNA-thyroid, smaller right nodule  Pap-Cyto x 6, Mock's stain-cyto x 3    ----------------------------------------------------------------    CYTOLOGIC INTERPRETATION:    A. FNA-thyroid, larger right nodule: Benign  Consistent with a benign nodule (includes adenomatoid nodule, colloid  nodule, etc.)      The Leonardo Implied Risk of Malignancy and Recommended Clinical  Management:  Benign has a 0-3% risk of malignancy, recommended management is clinical  follow-up    Specimen Adequacy: Satisfactory for evaluation but limited by:  -scant cellularity.    B. FNA-thyroid, smaller right nodule: Benign  -Consistent with a benign nodule (includes adenomatoid nodule, colloid  nodule, etc.)  -Features suggestive of associated chronic thyroiditis, Hashimoto's  type.    All pertinent notes, labs, and images personally reviewed by me.     A/P  Ms.Staci FCO Pollack is a 47 year old here for the evaluation of thyroid nodule:    #1 Thyroid Nodules.  Thyroid ultrasound ([facility]) showed several thyroid nodules; all of the nodules but two were < 1 cm.  Two right nodules measuring 2.3 x 1.2 x 1.8 cm and 1.1 x 1.0 x 1.1 cm, both of which had increased \"slightly\" since the prior exam.   Recent FNA biopsy of the two largest right nodules benign.  Denies any obstructive symptoms - no trouble swallowing or breathing.  Plan to repeat thyroid ultrasound.    Thyroid nodules are common and are frequently benign. Data suggest that the " prevalence of palpable thyroid nodules is 3% to 7% in [place]; the prevalence is as high as 50% based on ultrasonography (US) or autopsy data. All patients with a palpable thyroid nodule, however, should undergo US examination. US-guided FNA (US-FNA) is recommended for nodules ?10 mm; US-FNA is suggested for nodules <10 mm only if clinical information or US features are suspicious.    Causes of thyroid Nodules: Benign (Multinodular goiter, Hashimoto s thyroiditis, Simple or hemorrhagic cysts, Follicular adenomas, Subacute thyroiditis) or Malignant(Papillary carcinoma, Follicular carcinoma, Hürthle cell carcinoma, Medullary carcinoma, Anaplastic carcinoma, Primary thyroid lymphoma, or Metastatic malignant lesion).    MultiNodule:  The risk of cancer is not significantly higher in palpable solitary thyroid nodules than in multinodular lesions or in nodules in diffuse goiters. In multinodular thyroid glands, the cytologic sampling should be focused on lesions characterized by suspicious US features rather than on larger or clinically dominant nodules.    Cyst:  Most complex thyroid nodules with a dominant fluid component are benign. USFNA, however, should always be performed because the rare papillary thyroid carcinoma (PTC) can be cystic. An unsatisfactory (nondiagnostic) specimen usually results from a cystic nodule that yields few or no follicular cells. Reaspiration yields satisfactory results in 50% of cases.    Fine-Needle Aspiration Cytologic Diagnosis: 70% of FNA specimens are classified as benign; in addition, 5% are malignant, 10% are suspicious, and 10% to 20% are nondiagnostic or unsatisfactory. At surgical intervention, about 20% of such indeterminate/suspicious specimens are found to be malignant lesions.    Despite good initial technique, repeated biopsy, and US-FNA, approximately 5% of nodules still remain nondiagnostic. Such thyroid nodules should be surgically excised.    2.  Subclinical  hypothyroidism - TSH previously elevated with low-normal free T3 and low normal free  T4.  Antithyroglobulin and TPO antibodies were not elevated.  + symptoms consistent with hypothyroidism.  Briefly treated with levothyroxine - stopped on her own sometime in 2015 due to lack of perceived benefit.  Now noting significant fatigue - no identifiable cause.  Will obtain TFT's and recheck thyroid antibodies.    Other.  History of iron deficient anemia and previous history of vitamin D deficiency.  Will obtain additional labs for further evaluation of fatigue.    Labs ordered today:   Orders Placed This Encounter   Procedures     US Head Neck Soft Tissue     TSH     T4 FREE     Vitamin D Deficiency     Ferritin     Iron and iron binding capacity     CBC with platelets     Comprehensive metabolic panel     Thyroid peroxidase antibody     Anti thyroglobulin antibody     T3, Free     Radiology/Consults ordered today: US HEAD NECK SOFT TISSUE    More than 50% of the time spent with Ms. Pollack on counseling / coordinating her care.Total face to face time was 20 minutes.      Follow-up:  follow up to be determined based on labs    Anastacia Drake NP  Endocrinology  [facility]  CC:  aGbriele Majano Ayesha      Again, thank you for allowing me to participate in the care of your patient.        Sincerely,        VALENTINE Cooper CNP

## 2018-10-05 LAB
DEPRECATED CALCIDIOL+CALCIFEROL SERPL-MC: 38 UG/L (ref 20–75)
THYROGLOB AB SERPL IA-ACNC: <20 IU/ML (ref 0–40)
THYROPEROXIDASE AB SERPL-ACNC: 808 IU/ML

## 2018-10-06 NOTE — PROGRESS NOTES
Fiona,  Your thyroid antibodies are elevated consistent with Hashimoto's autoimmune thyroid disease.  Thyroid levels are all in normal range.  Your TSH is a little on the higher side of normal but I really don't think this is a major contributing factor to your current symptoms.  You will eventually become hypothyroid and require thyroid hormone treatment (if TSH is greater than 10 or the free T4 level is below normal - treatment is recommended).  If you decide you want to try going back on levothyroxine just to see if it makes any difference, please let me know and I can send a prescription to your pharmacy.  Your vitamin D level and iron levels are normal.  Liver and kidney function are normal.  Glucose and blood count are both normal.  I don't see any identifiable cause of your fatigue.  I would recommend seeing your primary care provider for further evaluation.  I'll let you know thyroid ultrasound results once available.  If you have the ultrasound done at [facility] again - try to make sure I get a copy of the results for review.  Please let me know if you have any questions or additional concerns.  Anastacia Drake NP  Endocrinology

## 2018-12-11 ENCOUNTER — OFFICE VISIT (OUTPATIENT)
Dept: FAMILY MEDICINE | Facility: CLINIC | Age: 48
End: 2018-12-11
Payer: COMMERCIAL

## 2018-12-11 VITALS
HEIGHT: 63 IN | BODY MASS INDEX: 31.36 KG/M2 | RESPIRATION RATE: 16 BRPM | SYSTOLIC BLOOD PRESSURE: 120 MMHG | WEIGHT: 177 LBS | TEMPERATURE: 98.3 F | HEART RATE: 68 BPM | DIASTOLIC BLOOD PRESSURE: 84 MMHG

## 2018-12-11 DIAGNOSIS — Z30.41 ENCOUNTER FOR SURVEILLANCE OF CONTRACEPTIVE PILLS: ICD-10-CM

## 2018-12-11 DIAGNOSIS — Z00.01 ENCOUNTER FOR ROUTINE ADULT MEDICAL EXAM WITH ABNORMAL FINDINGS: Primary | ICD-10-CM

## 2018-12-11 DIAGNOSIS — L71.9 ROSACEA: ICD-10-CM

## 2018-12-11 DIAGNOSIS — Z12.31 VISIT FOR SCREENING MAMMOGRAM: ICD-10-CM

## 2018-12-11 DIAGNOSIS — N92.0 MENORRHAGIA WITH REGULAR CYCLE: ICD-10-CM

## 2018-12-11 PROCEDURE — 99396 PREV VISIT EST AGE 40-64: CPT | Performed by: PHYSICIAN ASSISTANT

## 2018-12-11 PROCEDURE — 99213 OFFICE O/P EST LOW 20 MIN: CPT | Mod: 25 | Performed by: PHYSICIAN ASSISTANT

## 2018-12-11 RX ORDER — NORETHINDRONE ACETATE AND ETHINYL ESTRADIOL .03; 1.5 MG/1; MG/1
1 TABLET ORAL DAILY
Qty: 84 TABLET | Refills: 1 | Status: SHIPPED | OUTPATIENT
Start: 2018-12-11 | End: 2020-02-20

## 2018-12-11 RX ORDER — METRONIDAZOLE 7.5 MG/G
GEL TOPICAL 2 TIMES DAILY
Qty: 45 G | Refills: 3 | Status: SHIPPED | OUTPATIENT
Start: 2018-12-11 | End: 2019-12-11

## 2018-12-11 RX ORDER — NORETHINDRONE ACETATE AND ETHINYL ESTRADIOL .02; 1 MG/1; MG/1
1 TABLET ORAL DAILY
Qty: 84 TABLET | Refills: 3 | Status: CANCELLED | OUTPATIENT
Start: 2018-12-11

## 2018-12-11 ASSESSMENT — ENCOUNTER SYMPTOMS
WEAKNESS: 0
NERVOUS/ANXIOUS: 1
SHORTNESS OF BREATH: 0
NAUSEA: 0
EYE PAIN: 0
HEMATURIA: 0
COUGH: 0
ARTHRALGIAS: 0
CONSTIPATION: 0
BREAST MASS: 0
HEARTBURN: 0
PARESTHESIAS: 0
SORE THROAT: 0
DIARRHEA: 0
CHILLS: 1
PALPITATIONS: 0
JOINT SWELLING: 0
DYSURIA: 0
MYALGIAS: 0
FEVER: 0
ABDOMINAL PAIN: 0
FREQUENCY: 0
HEADACHES: 0
HEMATOCHEZIA: 0
DIZZINESS: 0

## 2018-12-11 ASSESSMENT — MIFFLIN-ST. JEOR: SCORE: 1399.06

## 2018-12-11 NOTE — PROGRESS NOTES
SUBJECTIVE:   CC: Fiona Pollack is an 48 year old woman who presents for preventive health visit.     Physical   Annual:     Getting at least 3 servings of Calcium per day:  NO    Bi-annual eye exam:  Yes    Dental care twice a year:  Yes    Sleep apnea or symptoms of sleep apnea:  None    Diet:  Regular (no restrictions)    Frequency of exercise:  1 day/week    Duration of exercise:  45-60 minutes    Taking medications regularly:  Yes    Medication side effects:  None    Additional concerns today:  Yes    PHQ-2 Total Score: 0      Monthly breakouts.  By the end of each period her face will clear up. Getting painful at times on her face.  Her  got a vasectomy so she could go off her birth control.  Getting bleeding at times of the month when she should not.  Hoping to go off birth control eventually.        Today's PHQ-2 Score:   PHQ-2 ( 1999 [employer]) 12/11/2018   Q1: Little interest or pleasure in doing things 0   Q2: Feeling down, depressed or hopeless 0   PHQ-2 Score 0   Q1: Little interest or pleasure in doing things Not at all   Q2: Feeling down, depressed or hopeless Not at all   PHQ-2 Score 0       Abuse: Current or Past(Physical, Sexual or Emotional)- No  Do you feel safe in your environment? Yes    Social History     Tobacco Use     Smoking status: Never Smoker     Smokeless tobacco: Never Used   Substance Use Topics     Alcohol use: Yes     Alcohol/week: 1.2 oz     Types: 2 Glasses of wine per week     Comment: occ     Alcohol Use 12/11/2018   If you drink alcohol do you typically have greater than 3 drinks per day OR greater than 7 drinks per week? No       Reviewed orders with patient.  Reviewed health maintenance and updated orders accordingly - Yes      Mammogram Screening: Patient under age 50, mutual decision reflected in health maintenance.      Pertinent mammograms are reviewed under the imaging tab.  History of abnormal Pap smear: NO - age 30-65 PAP every 5 years with negative HPV  "co-testing recommended  PAP / HPV Latest Ref Rng & Units 12/1/2017 10/26/2016 1/17/2014   PAP - NIL NIL NIL   HPV 16 DNA NEG:Negative Negative Negative -   HPV 18 DNA NEG:Negative Negative Negative -   OTHER HR HPV NEG:Negative Negative Positive(A) -     Reviewed and updated as needed this visit by clinical staff  Tobacco  Allergies  Meds  Med Hx  Surg Hx  Fam Hx  Soc Hx        Reviewed and updated as needed this visit by Provider               OBJECTIVE:   /84 (BP Location: Right arm, Patient Position: Sitting, Cuff Size: Adult Regular)   Pulse 68   Temp 98.3  F (36.8  C) (Oral)   Resp 16   Ht 1.588 m (5' 2.5\")   Wt 80.3 kg (177 lb)   LMP 11/25/2018 (Exact Date)   BMI 31.86 kg/m    Physical Exam  GENERAL: healthy, alert and no distress  EYES: Eyes grossly normal to inspection, PERRL and conjunctivae and sclerae normal  HENT: ear canals and TM's normal, nose and mouth without ulcers or lesions  NECK: no adenopathy, no asymmetry, masses, or scars and thyroid normal to palpation  RESP: lungs clear to auscultation - no rales, rhonchi or wheezes  BREAST: normal without masses, tenderness or nipple discharge and no palpable axillary masses or adenopathy  CV: regular rate and rhythm, normal S1 S2, no S3 or S4, no murmur, click or rub, no peripheral edema and peripheral pulses strong  ABDOMEN: soft, nontender, no hepatosplenomegaly, no masses and bowel sounds normal  MS: no gross musculoskeletal defects noted, no edema  SKIN: bilateral cheeks with erythema and papules  NEURO: Normal strength and tone, mentation intact and speech normal  PSYCH: mentation appears normal, affect normal/bright    Diagnostic Test Results:  none     ASSESSMENT/PLAN:   1. Encounter for routine adult medical exam with abnormal findings    - Lipid panel reflex to direct LDL Fasting; Future  - **CBC with platelets FUTURE anytime; Future  - Estradiol; Future  - Lutropin; Future  - Follicle stimulating hormone; Future  - **Basic " "metabolic panel FUTURE anytime; Future    2. Visit for screening mammogram    - *MA Screening Digital Bilateral; Future    3. Encounter for surveillance of contraceptive pills  Refilled.  - norethindrone-ethinyl estradiol (MICROGESTIN 1.5/30) 1.5-30 MG-MCG tablet; Take 1 tablet by mouth daily  Dispense: 84 tablet; Refill: 1    4. Rosacea  Will try topical gel first.  If does not work, can try oral agents.  To expect 4-8 weeks before she see's improvement.  - metroNIDAZOLE (METROGEL) 0.75 % external gel; Apply topically 2 times daily  Dispense: 45 g; Refill: 3    5. Menorrhagia with regular cycle  Labs for future use.  She plans to stay on OCP's until her husbands vasectomy check up late winter.  Then she will come in for labs.  - norethindrone-ethinyl estradiol (MICROGESTIN 1.5/30) 1.5-30 MG-MCG tablet; Take 1 tablet by mouth daily  Dispense: 84 tablet; Refill: 1  - Estradiol; Future  - Lutropin; Future  - Follicle stimulating hormone; Future    COUNSELING:  Reviewed preventive health counseling, as reflected in patient instructions    BP Readings from Last 1 Encounters:   12/11/18 120/84     Estimated body mass index is 31.86 kg/m  as calculated from the following:    Height as of this encounter: 1.588 m (5' 2.5\").    Weight as of this encounter: 80.3 kg (177 lb).      Weight management plan: Discussed healthy diet and exercise guidelines     reports that  has never smoked. she has never used smokeless tobacco.      Counseling Resources:  ATP IV Guidelines  Pooled Cohorts Equation Calculator  Breast Cancer Risk Calculator  FRAX Risk Assessment  ICSI Preventive Guidelines  Dietary Guidelines for Americans, 2010  USDA's MyPlate  ASA Prophylaxis  Lung CA Screening    Randal Nice PA-C  [facility]  "

## 2018-12-20 DIAGNOSIS — Z30.41 ENCOUNTER FOR SURVEILLANCE OF CONTRACEPTIVE PILLS: ICD-10-CM

## 2018-12-20 NOTE — TELEPHONE ENCOUNTER
"Requested Prescriptions   Pending Prescriptions Disp Refills     norethindrone-ethinyl estradiol (MICROGESTIN 1/20) 1-20 MG-MCG tablet [Pharmacy Med Name: NORETHIND-ETH ESTRAD 1-0.02 MG] 84 tablet 2    Last Written Prescription Date:  12/11/18  END:12/11/19  Last Fill Quantity: 84,  # refills: 1   Last Office Visit: 12/11/18 [place]      Return in about 1 year (around 12/11/2019) for Physical Exam.     Future Office Visit:      Sig: TAKE 1 TABLET BY MOUTH DAILY    Contraceptives Protocol Passed - 12/20/2018  1:25 AM       Passed - Patient is not a current smoker if age is 35 or older       Passed - Recent (12 mo) or future (30 days) visit within the authorizing provider's specialty    Patient had office visit in the last 12 months or has a visit in the next 30 days with authorizing provider or within the authorizing provider's specialty.  See \"Patient Info\" tab in inbasket, or \"Choose Columns\" in Meds & Orders section of the refill encounter.             Passed - No active pregnancy on record       Passed - No positive pregnancy test in past 12 months        "

## 2018-12-21 DIAGNOSIS — Z30.41 ENCOUNTER FOR SURVEILLANCE OF CONTRACEPTIVE PILLS: ICD-10-CM

## 2018-12-21 RX ORDER — NORETHINDRONE ACETATE AND ETHINYL ESTRADIOL .02; 1 MG/1; MG/1
1 TABLET ORAL DAILY
Qty: 84 TABLET | Refills: 2 | OUTPATIENT
Start: 2018-12-21

## 2018-12-21 NOTE — TELEPHONE ENCOUNTER
Duplicate  E-Prescribing Status: Receipt confirmed by pharmacy (12/11/2018  5:19 PM CST)  Karely Diego RN, BSN

## 2018-12-21 NOTE — TELEPHONE ENCOUNTER
"Requested Prescriptions   Pending Prescriptions Disp Refills     norethindrone-ethinyl estradiol (MICROGESTIN 1/20) 1-20 MG-MCG tablet [Pharmacy Med Name: NORETHIND-ETH ESTRAD 1-0.02 MG] 84 tablet 2    Last Written Prescription Date:  12/11/18  Last Fill Quantity: 84,  # refills: 1   Last Office Visit: 12/11/18  Tex      Return in about 1 year (around 12/11/2019) for Physical Exam.     Future Office Visit:      Sig: TAKE 1 TABLET BY MOUTH DAILY    Contraceptives Protocol Passed - 12/21/2018 11:02 AM       Passed - Patient is not a current smoker if age is 35 or older       Passed - Recent (12 mo) or future (30 days) visit within the authorizing provider's specialty    Patient had office visit in the last 12 months or has a visit in the next 30 days with authorizing provider or within the authorizing provider's specialty.  See \"Patient Info\" tab in inbasket, or \"Choose Columns\" in Meds & Orders section of the refill encounter.             Passed - No active pregnancy on record       Passed - No positive pregnancy test in past 12 months        "

## 2019-01-18 ENCOUNTER — OFFICE VISIT (OUTPATIENT)
Dept: URGENT CARE | Facility: URGENT CARE | Age: 49
End: 2019-01-18
Payer: COMMERCIAL

## 2019-01-18 VITALS
RESPIRATION RATE: 18 BRPM | HEART RATE: 69 BPM | SYSTOLIC BLOOD PRESSURE: 122 MMHG | OXYGEN SATURATION: 96 % | BODY MASS INDEX: 31.86 KG/M2 | DIASTOLIC BLOOD PRESSURE: 80 MMHG | WEIGHT: 177 LBS | TEMPERATURE: 98.2 F

## 2019-01-18 DIAGNOSIS — R07.0 THROAT PAIN: Primary | ICD-10-CM

## 2019-01-18 DIAGNOSIS — J20.8 VIRAL BRONCHITIS: ICD-10-CM

## 2019-01-18 LAB
DEPRECATED S PYO AG THROAT QL EIA: NORMAL
SPECIMEN SOURCE: NORMAL

## 2019-01-18 PROCEDURE — 87880 STREP A ASSAY W/OPTIC: CPT | Performed by: FAMILY MEDICINE

## 2019-01-18 PROCEDURE — 99213 OFFICE O/P EST LOW 20 MIN: CPT | Performed by: FAMILY MEDICINE

## 2019-01-18 PROCEDURE — 87081 CULTURE SCREEN ONLY: CPT | Performed by: FAMILY MEDICINE

## 2019-01-18 NOTE — PROGRESS NOTES
SUBJECTIVE:  Chief Complaint   Patient presents with     Urgent Care     Pharyngitis     Fiona Tam is a 48 year old female   with a chief complaint of sore throat.  Onset of symptoms was 6 day(s) ago.    Course of illness: gradual onset, still present and constant.  Severity moderate  Current and Associated symptoms: stuffy nose and sore throat  Treatment measures tried include OTC Cough med.  Predisposing factors include recent illness uri.    Past Medical History:   Diagnosis Date     Cervical high risk HPV (human papillomavirus) test positive 10/26/16    (not 16 or 18)     Subclinical hypothyroidism      Thyroid nodule      Patient Active Problem List   Diagnosis     Acne     Thyroid nodule     Fatigue     Subclinical hypothyroidism     Encounter for surveillance of contraceptive pills     Non morbid obesity due to excess calories     Irregular periods     Cervical high risk HPV (human papillomavirus) test positive         ALLERGIES:  Sulfa drugs      Current Outpatient Medications on File Prior to Visit:  metroNIDAZOLE (METROGEL) 0.75 % external gel Apply topically 2 times daily   norethindrone-ethinyl estradiol (MICROGESTIN 1.5/30) 1.5-30 MG-MCG tablet Take 1 tablet by mouth daily   norethindrone-ethinyl estradiol (MICROGESTIN 1/20) 1-20 MG-MCG per tablet Take 1 tablet by mouth daily (Patient not taking: Reported on 1/18/2019)     No current facility-administered medications on file prior to visit.     Social History     Tobacco Use     Smoking status: Never Smoker     Smokeless tobacco: Never Used   Substance Use Topics     Alcohol use: Yes     Alcohol/week: 1.2 oz     Types: 2 Glasses of wine per week     Comment: occ       Family History   Problem Relation Age of Onset     Cancer Mother 58        lung and brain cancer     Breast Cancer Mother      Heart Disease Father      Hypertension Father      Lipids Father      Alzheimer Disease Father          ROS:  CONSTITUTIONAL:NEGATIVE for fever, chills,    INTEGUMENTARY/SKIN: NEGATIVE for worrisome rashes,   EYES: NEGATIVE for vision changes or irritation  GI: NEGATIVE for nausea, abdominal pain,     OBJECTIVE:   /80   Pulse 69   Temp 98.2  F (36.8  C) (Oral)   Resp 18   Wt 80.3 kg (177 lb)   SpO2 96%   BMI 31.86 kg/m    GENERAL APPEARANCE: alert, mild distress and cooperative  EYES: EOMI,  PERRL, conjunctiva clear  HENT: ear canals and TM's normal.  Nose normal.  Pharynx little erythematous with no exudate noted.  NECK: supple, non-tender to palpation, no adenopathy noted  RESP: lungs clear to auscultation - no rales, rhonchi or wheezes  CV: regular rates and rhythm, normal S1 S2, no murmur noted  SKIN: no suspicious lesions or rashes    Rapid Strep test is negative    ASSESSMENT:  Throat pain     - Strep, Rapid Screen  - Beta strep group A culture       Viral bronchitis      discussed with the patient that a confirmatory strep culture will be performed and that she will be called if the culture is positive for strep.  We discussed other possible causes of pharyngitis including cold viruses     Symptomatic treat with gargles, lozenges, and OTC analgesic as needed. Follow-up with primary clinic if not improving.

## 2019-01-18 NOTE — PATIENT INSTRUCTIONS

## 2019-01-19 LAB
BACTERIA SPEC CULT: NORMAL
SPECIMEN SOURCE: NORMAL

## 2019-05-23 ENCOUNTER — ANCILLARY PROCEDURE (OUTPATIENT)
Dept: MAMMOGRAPHY | Facility: CLINIC | Age: 49
End: 2019-05-23
Payer: COMMERCIAL

## 2019-05-23 DIAGNOSIS — Z12.31 VISIT FOR SCREENING MAMMOGRAM: ICD-10-CM

## 2019-05-23 PROCEDURE — 77067 SCR MAMMO BI INCL CAD: CPT | Mod: TC

## 2020-02-07 ENCOUNTER — TELEPHONE (OUTPATIENT)
Dept: FAMILY MEDICINE | Facility: CLINIC | Age: 50
End: 2020-02-07

## 2020-02-07 NOTE — TELEPHONE ENCOUNTER
02/07/2020      Patient called stating she has numb fingertips and wanted to discuss this issue with a nurse. Patient would like to see what would be the causes of this and has appt with Tex 02/20/2020 soonest available. Please call when possible.    Ph:[phone]    Fito Castaneda -Patient Representative

## 2020-02-11 NOTE — TELEPHONE ENCOUNTER
Patient calling stating ever since an episode in September 2019 when she went fishing and it was about 45-50 degrees outside, her fingers went numb and she was unable to warm them up until she put them under her arm pits.  They then turned white in color.  She states that she has always had a history of being cold all the time.  Has had thyroid issues in the past.  Winter has always been a struggle for her.  States that the numbness can happen to fingers and toes on either hand or foot.  Does not notice any swelling.  Does not remember if it is cold or warm to the touch.  Notes that they turn white and then sometimes red,  She will put them under her armpits for 10 minutes and they get better.    Patient has a follow up appointment with Randal Nice on 2/20/2020 to discuss further.  Advised for now to continue to keep hands and toes warm, eat healthy diet, exercise and relax.    Jeny Smith RN

## 2020-02-20 ENCOUNTER — OFFICE VISIT (OUTPATIENT)
Dept: FAMILY MEDICINE | Facility: CLINIC | Age: 50
End: 2020-02-20
Payer: COMMERCIAL

## 2020-02-20 VITALS
OXYGEN SATURATION: 99 % | DIASTOLIC BLOOD PRESSURE: 78 MMHG | SYSTOLIC BLOOD PRESSURE: 116 MMHG | WEIGHT: 160 LBS | TEMPERATURE: 98.3 F | HEART RATE: 67 BPM | HEIGHT: 63 IN | RESPIRATION RATE: 16 BRPM | BODY MASS INDEX: 28.35 KG/M2

## 2020-02-20 DIAGNOSIS — R20.0 NUMBNESS OF FINGERS OF BOTH HANDS: ICD-10-CM

## 2020-02-20 DIAGNOSIS — Z80.3 FH: BREAST CANCER IN FIRST DEGREE RELATIVE: ICD-10-CM

## 2020-02-20 DIAGNOSIS — E03.8 SUBCLINICAL HYPOTHYROIDISM: ICD-10-CM

## 2020-02-20 DIAGNOSIS — Z00.00 ROUTINE GENERAL MEDICAL EXAMINATION AT A HEALTH CARE FACILITY: Primary | ICD-10-CM

## 2020-02-20 DIAGNOSIS — Z12.31 ENCOUNTER FOR SCREENING MAMMOGRAM FOR BREAST CANCER: ICD-10-CM

## 2020-02-20 DIAGNOSIS — I73.00 RAYNAUD'S DISEASE WITHOUT GANGRENE: ICD-10-CM

## 2020-02-20 LAB
CRP SERPL-MCNC: <2.9 MG/L (ref 0–8)
ERYTHROCYTE [DISTWIDTH] IN BLOOD BY AUTOMATED COUNT: 13 % (ref 10–15)
ERYTHROCYTE [SEDIMENTATION RATE] IN BLOOD BY WESTERGREN METHOD: 7 MM/H (ref 0–20)
HCT VFR BLD AUTO: 40.5 % (ref 35–47)
HGB BLD-MCNC: 13.1 G/DL (ref 11.7–15.7)
MCH RBC QN AUTO: 29.1 PG (ref 26.5–33)
MCHC RBC AUTO-ENTMCNC: 32.3 G/DL (ref 31.5–36.5)
MCV RBC AUTO: 90 FL (ref 78–100)
PLATELET # BLD AUTO: 289 10E9/L (ref 150–450)
RBC # BLD AUTO: 4.5 10E12/L (ref 3.8–5.2)
WBC # BLD AUTO: 4.4 10E9/L (ref 4–11)

## 2020-02-20 PROCEDURE — 85652 RBC SED RATE AUTOMATED: CPT | Performed by: PHYSICIAN ASSISTANT

## 2020-02-20 PROCEDURE — 36415 COLL VENOUS BLD VENIPUNCTURE: CPT | Performed by: PHYSICIAN ASSISTANT

## 2020-02-20 PROCEDURE — 99213 OFFICE O/P EST LOW 20 MIN: CPT | Mod: 25 | Performed by: PHYSICIAN ASSISTANT

## 2020-02-20 PROCEDURE — 86038 ANTINUCLEAR ANTIBODIES: CPT | Performed by: PHYSICIAN ASSISTANT

## 2020-02-20 PROCEDURE — 85027 COMPLETE CBC AUTOMATED: CPT | Performed by: PHYSICIAN ASSISTANT

## 2020-02-20 PROCEDURE — 80061 LIPID PANEL: CPT | Performed by: PHYSICIAN ASSISTANT

## 2020-02-20 PROCEDURE — 99396 PREV VISIT EST AGE 40-64: CPT | Performed by: PHYSICIAN ASSISTANT

## 2020-02-20 PROCEDURE — 86431 RHEUMATOID FACTOR QUANT: CPT | Performed by: PHYSICIAN ASSISTANT

## 2020-02-20 PROCEDURE — 84443 ASSAY THYROID STIM HORMONE: CPT | Performed by: PHYSICIAN ASSISTANT

## 2020-02-20 PROCEDURE — 86140 C-REACTIVE PROTEIN: CPT | Performed by: PHYSICIAN ASSISTANT

## 2020-02-20 PROCEDURE — 80053 COMPREHEN METABOLIC PANEL: CPT | Performed by: PHYSICIAN ASSISTANT

## 2020-02-20 RX ORDER — AMLODIPINE BESYLATE 2.5 MG/1
2.5 TABLET ORAL DAILY
Qty: 90 TABLET | Refills: 0 | Status: SHIPPED | OUTPATIENT
Start: 2020-02-20 | End: 2021-10-28

## 2020-02-20 ASSESSMENT — ENCOUNTER SYMPTOMS
SHORTNESS OF BREATH: 0
ARTHRALGIAS: 0
DYSURIA: 0
COUGH: 0
HEARTBURN: 0
PALPITATIONS: 0
EYE PAIN: 0
FREQUENCY: 0
DIZZINESS: 0
FEVER: 0
BREAST MASS: 0
MYALGIAS: 0
CHILLS: 0
WEAKNESS: 0
SORE THROAT: 0
ABDOMINAL PAIN: 0
HEMATOCHEZIA: 0
PARESTHESIAS: 1
JOINT SWELLING: 0
HEMATURIA: 0
CONSTIPATION: 0
NAUSEA: 0
DIARRHEA: 0
NERVOUS/ANXIOUS: 0
HEADACHES: 0

## 2020-02-20 ASSESSMENT — MIFFLIN-ST. JEOR: SCORE: 1320.92

## 2020-02-20 NOTE — PROGRESS NOTES
SUBJECTIVE:   CC: Fiona Tam is an 49 year old woman who presents for preventive health visit.     Healthy Habits:     Getting at least 3 servings of Calcium per day:  NO    Bi-annual eye exam:  Yes    Dental care twice a year:  Yes    Sleep apnea or symptoms of sleep apnea:  None    Diet:  Regular (no restrictions)    Frequency of exercise:  4-5 days/week    Duration of exercise:  30-45 minutes    Taking medications regularly:  Not Applicable    Medication side effects:  Not applicable    PHQ-2 Total Score: 0    Additional concerns today:  No    Patient here for a physical today.  Has been noticing that her fingers and at time toes are feeling very cold, at times white in color and numb.  Worse with cold exposure.  Notes a strong FH of rheumatoid arthritis so wants to make sure that there is nothing underlying the reason for her fingers feeling this way.  Not joint pain or swelling noted however.      Has been dieting and exercising.  Trying to work out 5 days per week at least and be more intentional regarding diet choices.  She has lost weight since last time she was in.    Wt Readings from Last 4 Encounters:   02/20/20 72.6 kg (160 lb)   01/18/19 80.3 kg (177 lb)   12/11/18 80.3 kg (177 lb)   10/04/18 78.9 kg (174 lb)       Today's PHQ-2 Score:   PHQ-2 ( 1999 Pfizer) 2/20/2020   Q1: Little interest or pleasure in doing things 0   Q2: Feeling down, depressed or hopeless 0   PHQ-2 Score 0   Q1: Little interest or pleasure in doing things Not at all   Q2: Feeling down, depressed or hopeless Not at all   PHQ-2 Score 0       Abuse: Current or Past(Physical, Sexual or Emotional)- No  Do you feel safe in your environment? Yes        Social History     Tobacco Use     Smoking status: Never Smoker     Smokeless tobacco: Never Used   Substance Use Topics     Alcohol use: Yes     Alcohol/week: 2.0 standard drinks     Types: 2 Glasses of wine per week     Comment: occ     If you drink alcohol do you typically have >3  drinks per day or >7 drinks per week? No    Alcohol Use 2/20/2020   Prescreen: >3 drinks/day or >7 drinks/week? No   Prescreen: >3 drinks/day or >7 drinks/week? -       Reviewed orders with patient.  Reviewed health maintenance and updated orders accordingly - Yes  Lab work is in process  Labs reviewed in [facility]    Mammogram Screening: Patient under age 50, mutual decision reflected in health maintenance.  Pertinent mammograms are reviewed under the imaging tab.  Goes yearly due to mom and grandmother with hx of breast cancer.    History of abnormal Pap smear: YES - updated in Problem List and Health Maintenance accordingly.  Due end of 2020 for 3 year repeat.    PAP / HPV Latest Ref Rng & Units 12/1/2017 10/26/2016 1/17/2014   PAP - NIL NIL NIL   HPV 16 DNA NEG:Negative Negative Negative -   HPV 18 DNA NEG:Negative Negative Negative -   OTHER HR HPV NEG:Negative Negative Positive(A) -     Reviewed and updated as needed this visit by clinical staff  Tobacco  Allergies  Meds  Med Hx  Surg Hx  Fam Hx  Soc Hx        Reviewed and updated as needed this visit by Provider  Fam Hx          Review of Systems   Constitutional: Negative for chills and fever.   HENT: Negative for congestion, ear pain, hearing loss and sore throat.    Eyes: Negative for pain and visual disturbance.   Respiratory: Negative for cough and shortness of breath.    Cardiovascular: Negative for chest pain, palpitations and peripheral edema.   Gastrointestinal: Negative for abdominal pain, constipation, diarrhea, heartburn, hematochezia and nausea.   Breasts:  Negative for tenderness, breast mass and discharge.   Genitourinary: Negative for dysuria, frequency, genital sores, hematuria, pelvic pain, urgency, vaginal bleeding and vaginal discharge.   Musculoskeletal: Negative for arthralgias, joint swelling and myalgias.   Skin: Negative for rash.   Neurological: Positive for paresthesias. Negative for dizziness, weakness and headaches.  "  Psychiatric/Behavioral: Negative for mood changes. The patient is not nervous/anxious.         OBJECTIVE:   /78 (BP Location: Right arm, Patient Position: Sitting, Cuff Size: Adult Regular)   Pulse 67   Temp 98.3  F (36.8  C) (Oral)   Resp 16   Ht 1.594 m (5' 2.75\")   Wt 72.6 kg (160 lb)   LMP 02/09/2020 (Exact Date)   SpO2 99%   BMI 28.57 kg/m    Physical Exam  GENERAL: healthy, alert and no distress  EYES: Eyes grossly normal to inspection, PERRL and conjunctivae and sclerae normal  HENT: ear canals and TM's normal, nose and mouth without ulcers or lesions  NECK: no adenopathy, no asymmetry, masses, or scars and thyroid normal to palpation  RESP: lungs clear to auscultation - no rales, rhonchi or wheezes  BREAST: normal without masses, tenderness or nipple discharge and no palpable axillary masses or adenopathy- some mild fibrocystic changes bilateral  CV: regular rate and rhythm, normal S1 S2, no S3 or S4, no murmur, click or rub, no peripheral edema and peripheral pulses strong  ABDOMEN: soft, nontender, no hepatosplenomegaly, no masses and bowel sounds normal  MS: no gross musculoskeletal defects noted, no edema  SKIN: no suspicious lesions or rashes  NEURO: Normal strength and tone, mentation intact and speech normal  PSYCH: mentation appears normal, affect normal/bright    Diagnostic Test Results:  Labs reviewed in Epic    ASSESSMENT/PLAN:   1. Routine general medical examination at a health care facility    - Lipid panel reflex to direct LDL Fasting  - Comprehensive metabolic panel  - CBC with platelets    2. Subclinical hypothyroidism  She has been on medication in the past but not currently.  Recheck lab.  - TSH with free T4 reflex    3. Numbness of fingers of both hands  Will check labs today.  Follow up pending results.  - TSH with free T4 reflex  - CRP inflammation  - Erythrocyte sedimentation rate auto  - Rheumatoid factor  - Anti Nuclear Alondra IgG by IFA with Reflex    4. Raynaud's " "disease without gangrene  Trial of calcium damian blocker.  Discussed mechanism of action of medication, proper dosing, potential side effects and appropriate follow up.    - Anti Nuclear Alondra IgG by IFA with Reflex  - amLODIPine 2.5 MG PO tablet; Take 1 tablet (2.5 mg) by mouth daily  Dispense: 90 tablet; Refill: 0    5. Encounter for screening mammogram for breast cancer    - MA Diagnostic Digital Bilateral; Future    6. FH: breast cancer in first degree relative  Noted in history- yearly mammograms.      COUNSELING:  Reviewed preventive health counseling, as reflected in patient instructions    Estimated body mass index is 28.57 kg/m  as calculated from the following:    Height as of this encounter: 1.594 m (5' 2.75\").    Weight as of this encounter: 72.6 kg (160 lb).    Weight management plan: Discussed healthy diet and exercise guidelines     reports that she has never smoked. She has never used smokeless tobacco.      Counseling Resources:  ATP IV Guidelines  Pooled Cohorts Equation Calculator  Breast Cancer Risk Calculator  FRAX Risk Assessment  ICSI Preventive Guidelines  Dietary Guidelines for Americans, 2010  USDA's MyPlate  ASA Prophylaxis  Lung CA Screening    Randal Nice PA-C  [facility]  "

## 2020-02-21 LAB
ALBUMIN SERPL-MCNC: 4.1 G/DL (ref 3.4–5)
ALP SERPL-CCNC: 39 U/L (ref 40–150)
ALT SERPL W P-5'-P-CCNC: 25 U/L (ref 0–50)
ANA SER QL IF: NEGATIVE
ANION GAP SERPL CALCULATED.3IONS-SCNC: 6 MMOL/L (ref 3–14)
AST SERPL W P-5'-P-CCNC: 15 U/L (ref 0–45)
BILIRUB SERPL-MCNC: 0.4 MG/DL (ref 0.2–1.3)
BUN SERPL-MCNC: 11 MG/DL (ref 7–30)
CALCIUM SERPL-MCNC: 8.8 MG/DL (ref 8.5–10.1)
CHLORIDE SERPL-SCNC: 104 MMOL/L (ref 94–109)
CHOLEST SERPL-MCNC: 194 MG/DL
CO2 SERPL-SCNC: 26 MMOL/L (ref 20–32)
CREAT SERPL-MCNC: 0.68 MG/DL (ref 0.52–1.04)
GFR SERPL CREATININE-BSD FRML MDRD: >90 ML/MIN/{1.73_M2}
GLUCOSE SERPL-MCNC: 86 MG/DL (ref 70–99)
HDLC SERPL-MCNC: 73 MG/DL
LDLC SERPL CALC-MCNC: 113 MG/DL
NONHDLC SERPL-MCNC: 121 MG/DL
POTASSIUM SERPL-SCNC: 3.8 MMOL/L (ref 3.4–5.3)
PROT SERPL-MCNC: 7.2 G/DL (ref 6.8–8.8)
RHEUMATOID FACT SER NEPH-ACNC: <20 IU/ML (ref 0–20)
SODIUM SERPL-SCNC: 136 MMOL/L (ref 133–144)
TRIGL SERPL-MCNC: 40 MG/DL
TSH SERPL DL<=0.005 MIU/L-ACNC: 3.61 MU/L (ref 0.4–4)

## 2020-02-24 ENCOUNTER — HEALTH MAINTENANCE LETTER (OUTPATIENT)
Age: 50
End: 2020-02-24

## 2020-12-08 ENCOUNTER — ANCILLARY PROCEDURE (OUTPATIENT)
Dept: MAMMOGRAPHY | Facility: CLINIC | Age: 50
End: 2020-12-08
Attending: PHYSICIAN ASSISTANT
Payer: COMMERCIAL

## 2020-12-08 DIAGNOSIS — Z12.31 VISIT FOR SCREENING MAMMOGRAM: ICD-10-CM

## 2020-12-08 PROCEDURE — 77063 BREAST TOMOSYNTHESIS BI: CPT | Performed by: RADIOLOGY

## 2020-12-08 PROCEDURE — 77067 SCR MAMMO BI INCL CAD: CPT | Performed by: RADIOLOGY

## 2020-12-13 ENCOUNTER — HEALTH MAINTENANCE LETTER (OUTPATIENT)
Age: 50
End: 2020-12-13

## 2021-02-21 ENCOUNTER — HEALTH MAINTENANCE LETTER (OUTPATIENT)
Age: 51
End: 2021-02-21

## 2021-04-17 ENCOUNTER — HEALTH MAINTENANCE LETTER (OUTPATIENT)
Age: 51
End: 2021-04-17

## 2021-09-26 ENCOUNTER — HEALTH MAINTENANCE LETTER (OUTPATIENT)
Age: 51
End: 2021-09-26

## 2021-10-28 ENCOUNTER — OFFICE VISIT (OUTPATIENT)
Dept: FAMILY MEDICINE | Facility: CLINIC | Age: 51
End: 2021-10-28
Payer: COMMERCIAL

## 2021-10-28 VITALS
RESPIRATION RATE: 18 BRPM | BODY MASS INDEX: 30 KG/M2 | SYSTOLIC BLOOD PRESSURE: 132 MMHG | TEMPERATURE: 98.2 F | DIASTOLIC BLOOD PRESSURE: 86 MMHG | OXYGEN SATURATION: 99 % | WEIGHT: 168 LBS | HEART RATE: 66 BPM

## 2021-10-28 DIAGNOSIS — Z00.00 ROUTINE GENERAL MEDICAL EXAMINATION AT A HEALTH CARE FACILITY: Primary | ICD-10-CM

## 2021-10-28 DIAGNOSIS — E03.8 SUBCLINICAL HYPOTHYROIDISM: ICD-10-CM

## 2021-10-28 DIAGNOSIS — E04.1 THYROID NODULE: ICD-10-CM

## 2021-10-28 DIAGNOSIS — Z12.4 SCREENING FOR MALIGNANT NEOPLASM OF CERVIX: ICD-10-CM

## 2021-10-28 DIAGNOSIS — E66.09 NON MORBID OBESITY DUE TO EXCESS CALORIES: ICD-10-CM

## 2021-10-28 DIAGNOSIS — R87.810 CERVICAL HIGH RISK HPV (HUMAN PAPILLOMAVIRUS) TEST POSITIVE: ICD-10-CM

## 2021-10-28 DIAGNOSIS — Z80.3 FH: BREAST CANCER IN FIRST DEGREE RELATIVE: ICD-10-CM

## 2021-10-28 DIAGNOSIS — I73.00 RAYNAUD'S DISEASE WITHOUT GANGRENE: ICD-10-CM

## 2021-10-28 LAB
ANION GAP SERPL CALCULATED.3IONS-SCNC: 6 MMOL/L (ref 3–14)
BUN SERPL-MCNC: 12 MG/DL (ref 7–30)
CALCIUM SERPL-MCNC: 9.1 MG/DL (ref 8.5–10.1)
CHLORIDE BLD-SCNC: 102 MMOL/L (ref 94–109)
CHOLEST SERPL-MCNC: 188 MG/DL
CO2 SERPL-SCNC: 24 MMOL/L (ref 20–32)
CREAT SERPL-MCNC: 0.7 MG/DL (ref 0.52–1.04)
FASTING STATUS PATIENT QL REPORTED: YES
GFR SERPL CREATININE-BSD FRML MDRD: >90 ML/MIN/1.73M2
GLUCOSE BLD-MCNC: 77 MG/DL (ref 70–99)
HDLC SERPL-MCNC: 80 MG/DL
LDLC SERPL CALC-MCNC: 101 MG/DL
NONHDLC SERPL-MCNC: 108 MG/DL
POTASSIUM BLD-SCNC: 4.3 MMOL/L (ref 3.4–5.3)
SODIUM SERPL-SCNC: 132 MMOL/L (ref 133–144)
T4 FREE SERPL-MCNC: 0.88 NG/DL (ref 0.76–1.46)
TRIGL SERPL-MCNC: 36 MG/DL
TSH SERPL DL<=0.005 MIU/L-ACNC: 4.54 MU/L (ref 0.4–4)

## 2021-10-28 PROCEDURE — 90686 IIV4 VACC NO PRSV 0.5 ML IM: CPT | Performed by: NURSE PRACTITIONER

## 2021-10-28 PROCEDURE — 84443 ASSAY THYROID STIM HORMONE: CPT | Performed by: NURSE PRACTITIONER

## 2021-10-28 PROCEDURE — 80061 LIPID PANEL: CPT | Performed by: NURSE PRACTITIONER

## 2021-10-28 PROCEDURE — 84439 ASSAY OF FREE THYROXINE: CPT | Performed by: NURSE PRACTITIONER

## 2021-10-28 PROCEDURE — 87624 HPV HI-RISK TYP POOLED RSLT: CPT | Performed by: NURSE PRACTITIONER

## 2021-10-28 PROCEDURE — G0145 SCR C/V CYTO,THINLAYER,RESCR: HCPCS | Performed by: NURSE PRACTITIONER

## 2021-10-28 PROCEDURE — 80048 BASIC METABOLIC PNL TOTAL CA: CPT | Performed by: NURSE PRACTITIONER

## 2021-10-28 PROCEDURE — 36415 COLL VENOUS BLD VENIPUNCTURE: CPT | Performed by: NURSE PRACTITIONER

## 2021-10-28 PROCEDURE — 99396 PREV VISIT EST AGE 40-64: CPT | Mod: 25 | Performed by: NURSE PRACTITIONER

## 2021-10-28 PROCEDURE — 90471 IMMUNIZATION ADMIN: CPT | Performed by: NURSE PRACTITIONER

## 2021-10-28 RX ORDER — AMLODIPINE BESYLATE 2.5 MG/1
2.5 TABLET ORAL DAILY
Qty: 90 TABLET | Refills: 0 | Status: SHIPPED | OUTPATIENT
Start: 2021-10-28 | End: 2022-12-08

## 2021-10-28 ASSESSMENT — ENCOUNTER SYMPTOMS
CHILLS: 0
NAUSEA: 0
DIZZINESS: 0
ABDOMINAL PAIN: 0
HEMATURIA: 0
HEADACHES: 0
HEMATOCHEZIA: 0
DYSURIA: 0
WEAKNESS: 0
HEARTBURN: 0
BREAST MASS: 0
FEVER: 0
JOINT SWELLING: 0
ARTHRALGIAS: 0
PALPITATIONS: 0
CONSTIPATION: 0
COUGH: 0
NERVOUS/ANXIOUS: 0
MYALGIAS: 0
EYE PAIN: 0
SHORTNESS OF BREATH: 0
DIARRHEA: 0
FREQUENCY: 0
SORE THROAT: 0
PARESTHESIAS: 0

## 2021-10-28 NOTE — PROGRESS NOTES
SUBJECTIVE:   CC: Fiona Tam is an 50 year old woman who presents for preventive health visit.       Patient has been advised of split billing requirements and indicates understanding: Yes  Healthy Habits:     Getting at least 3 servings of Calcium per day:  NO    Bi-annual eye exam:  Yes    Dental care twice a year:  Yes    Sleep apnea or symptoms of sleep apnea:  None    Diet:  Regular (no restrictions)    Frequency of exercise:  2-3 days/week    Duration of exercise:  30-45 minutes    Taking medications regularly:  Yes    Medication side effects:  None    PHQ-2 Total Score: 0    Additional concerns today:  No    Committed relationship, regular periods.     Today's PHQ-2 Score:   PHQ-2 ( 1999 Pfizer) 10/28/2021   Q1: Little interest or pleasure in doing things 0   Q2: Feeling down, depressed or hopeless 0   PHQ-2 Score 0   Q1: Little interest or pleasure in doing things Not at all   Q2: Feeling down, depressed or hopeless Not at all   PHQ-2 Score 0       Abuse: Current or Past (Physical, Sexual or Emotional) - No  Do you feel safe in your environment? Yes    Have you ever done Advance Care Planning? (For example, a Health Directive, POLST, or a discussion with a medical provider or your loved ones about your wishes): No, advance care planning information given to patient to review.  Patient plans to discuss their wishes with loved ones or provider.      Social History     Tobacco Use     Smoking status: Never Smoker     Smokeless tobacco: Never Used   Substance Use Topics     Alcohol use: Yes     Alcohol/week: 2.0 standard drinks     Types: 2 Glasses of wine per week     Comment: occ         Alcohol Use 10/28/2021   Prescreen: >3 drinks/day or >7 drinks/week? No   Prescreen: >3 drinks/day or >7 drinks/week? -       Reviewed orders with patient.  Reviewed health maintenance and updated orders accordingly - Yes  Lab work is in process  Labs reviewed in EPIC    Breast Cancer Screening:  Any new diagnosis of  family breast, ovarian, or bowel cancer?     FHS-7:   Breast CA Risk Assessment (FHS-7) 10/28/2021   Did any of your first-degree relatives have breast or ovarian cancer? Yes   Did any of your relatives have bilateral breast cancer? No   Did any man in your family have breast cancer? No   Did any woman in your family have breast and ovarian cancer? Yes   Did any woman in your family have breast cancer before age 50 y? No   Do you have 2 or more relatives with breast and/or ovarian cancer? Yes   Do you have 2 or more relatives with breast and/or bowel cancer? Yes       Mammogram Screening: Recommended annual mammography  Pertinent mammograms are reviewed under the imaging tab.    History of abnormal Pap smear:   PAP / HPV Latest Ref Rng & Units 12/1/2017 10/26/2016 1/17/2014   PAP (Historical) - NIL NIL NIL   HPV16 NEG:Negative Negative Negative -   HPV18 NEG:Negative Negative Negative -   HRHPV NEG:Negative Negative Positive(A) -     Reviewed and updated as needed this visit by clinical staff  Tobacco  Allergies  Meds  Problems  Med Hx  Surg Hx  Fam Hx          Reviewed and updated as needed this visit by Provider  Tobacco  Allergies  Meds  Problems  Med Hx  Surg Hx  Fam Hx         Past Medical History:   Diagnosis Date     Cervical high risk HPV (human papillomavirus) test positive 10/26/16    (not 16 or 18)     Subclinical hypothyroidism      Thyroid nodule       Past Surgical History:   Procedure Laterality Date     DENTAL SURGERY  1994    wisdom teeth     FOOT SURGERY Left 1995    Bunion       Review of Systems   Constitutional: Negative for chills and fever.   HENT: Negative for congestion, ear pain, hearing loss and sore throat.    Eyes: Negative for pain and visual disturbance.   Respiratory: Negative for cough and shortness of breath.    Cardiovascular: Negative for chest pain, palpitations and peripheral edema.   Gastrointestinal: Negative for abdominal pain, constipation, diarrhea, heartburn,  hematochezia and nausea.   Breasts:  Negative for tenderness, breast mass and discharge.   Genitourinary: Negative for dysuria, frequency, genital sores, hematuria, pelvic pain, urgency, vaginal bleeding and vaginal discharge.   Musculoskeletal: Negative for arthralgias, joint swelling and myalgias.   Skin: Negative for rash.   Neurological: Negative for dizziness, weakness, headaches and paresthesias.   Psychiatric/Behavioral: Negative for mood changes. The patient is not nervous/anxious.           OBJECTIVE:   /86 (BP Location: Right arm, Patient Position: Sitting, Cuff Size: Adult Large)   Pulse 66   Temp 98.2  F (36.8  C) (Oral)   Resp 18   Wt 76.2 kg (168 lb)   LMP 10/04/2021   SpO2 99%   BMI 30.00 kg/m    Physical Exam  GENERAL: healthy, alert and no distress  EYES: Eyes grossly normal to inspection, PERRL and conjunctivae and sclerae normal  HENT: ear canals and TM's normal, nose and mouth without ulcers or lesions  NECK: no adenopathy, no asymmetry, masses, or scars and thyroid normal to palpation  RESP: lungs clear to auscultation - no rales, rhonchi or wheezes  BREAST: normal without masses, tenderness or nipple discharge and no palpable axillary masses or adenopathy  CV: regular rate and rhythm, normal S1 S2, no S3 or S4, no murmur, click or rub, no peripheral edema and peripheral pulses strong  ABDOMEN: soft, nontender, no hepatosplenomegaly, no masses and bowel sounds normal  MS: no gross musculoskeletal defects noted, no edema  SKIN: no suspicious lesions or rashes  NEURO: Normal strength and tone, mentation intact and speech normal  PSYCH: mentation appears normal, affect normal/bright    Diagnostic Test Results:  Labs reviewed in Epic    ASSESSMENT/PLAN:   Fiona was seen today for physical.    Diagnoses and all orders for this visit:    Routine general medical examination at a health care facility    Thyroid nodule  -     TSH with free T4 reflex; Future  -     TSH with free T4  "reflex    Cervical high risk HPV (human papillomavirus) test positive    Subclinical hypothyroidism    Screening for malignant neoplasm of cervix  -     Pap Screen with HPV - recommended age 30 - 65 years    FH: breast cancer in first degree relative  -     *MA Screening Digital Bilateral; Future    Raynaud's disease without gangrene  -     amLODIPine (NORVASC) 2.5 MG tablet; Take 1 tablet (2.5 mg) by mouth daily    Non morbid obesity due to excess calories  -     Lipid panel reflex to direct LDL Fasting; Future  -     Basic metabolic panel  (Ca, Cl, CO2, Creat, Gluc, K, Na, BUN); Future  -     Lipid panel reflex to direct LDL Fasting  -     Basic metabolic panel  (Ca, Cl, CO2, Creat, Gluc, K, Na, BUN)    Other orders  -     REVIEW OF HEALTH MAINTENANCE PROTOCOL ORDERS  -     INFLUENZA VACCINE IM >6 MO VALENT IIV4 (ALFURIA/FLUZONE)        Patient has been advised of split billing requirements and indicates understanding: Yes  COUNSELING:  Reviewed preventive health counseling, as reflected in patient instructions       Regular exercise       Healthy diet/nutrition    Estimated body mass index is 30 kg/m  as calculated from the following:    Height as of 2/20/20: 1.594 m (5' 2.75\").    Weight as of this encounter: 76.2 kg (168 lb).    Weight management plan: Discussed healthy diet and exercise guidelines    She reports that she has never smoked. She has never used smokeless tobacco.      Counseling Resources:  ATP IV Guidelines  Pooled Cohorts Equation Calculator  Breast Cancer Risk Calculator  BRCA-Related Cancer Risk Assessment: FHS-7 Tool  FRAX Risk Assessment  ICSI Preventive Guidelines  Dietary Guidelines for Americans, 2010  USDA's MyPlate  ASA Prophylaxis  Lung CA Screening    VLAENTINE [facility]"

## 2021-11-01 LAB
BKR LAB AP GYN ADEQUACY: NORMAL
BKR LAB AP GYN INTERPRETATION: NORMAL
BKR LAB AP HPV REFLEX: NORMAL
BKR LAB AP PREVIOUS ABNL DX: NORMAL
BKR LAB AP PREVIOUS ABNORMAL: NORMAL
PATH REPORT.COMMENTS IMP SPEC: NORMAL
PATH REPORT.RELEVANT HX SPEC: NORMAL

## 2021-11-03 LAB
HUMAN PAPILLOMA VIRUS 16 DNA: NEGATIVE
HUMAN PAPILLOMA VIRUS 18 DNA: NEGATIVE
HUMAN PAPILLOMA VIRUS FINAL DIAGNOSIS: ABNORMAL
HUMAN PAPILLOMA VIRUS OTHER HR: POSITIVE

## 2021-11-04 ENCOUNTER — PATIENT OUTREACH (OUTPATIENT)
Dept: FAMILY MEDICINE | Facility: CLINIC | Age: 51
End: 2021-11-04

## 2021-11-04 DIAGNOSIS — R87.810 CERVICAL HIGH RISK HPV (HUMAN PAPILLOMAVIRUS) TEST POSITIVE: ICD-10-CM

## 2021-11-29 ENCOUNTER — NURSE TRIAGE (OUTPATIENT)
Dept: NURSING | Facility: CLINIC | Age: 51
End: 2021-11-29
Payer: COMMERCIAL

## 2021-11-29 NOTE — TELEPHONE ENCOUNTER
Here are my thoughts.    She is due for a covid booster.  I would get that first.  I think right now this is more important.  Would not do this at the same time as shingles vaccine.      The shingles vaccine is not a live vaccine so she can safely be around her mother in law after she gets it.    And yes, have her double check with insurance about getting the shingles vaccine in a pharmacy vs clinic.        Thank you,  Randal

## 2021-11-29 NOTE — TELEPHONE ENCOUNTER
Patient is wondering about getting the shingles Vax. Shingrix    She has a mother in law who has never had the chickenpox, and she told the patient that she cannot be in her presence   After she recieves the Shingrix.    She is asking for information about this, and would appreciate a call so she can be respectful about her Mother in laws wishes.    Also she was advised that she should get her shingrix at the pharmacy, and not the clinic.    Cristina advised, and call patient .    Lisset Raman, RN RN  [facility] Triage/refill nurse    Additional Information    Information only question and nurse able to answer    Protocols used: INFORMATION ONLY CALL - NO TRIAGE-A-OH

## 2021-12-09 ENCOUNTER — ANCILLARY PROCEDURE (OUTPATIENT)
Dept: MAMMOGRAPHY | Facility: CLINIC | Age: 51
End: 2021-12-09
Attending: NURSE PRACTITIONER
Payer: COMMERCIAL

## 2021-12-09 DIAGNOSIS — Z80.3 FH: BREAST CANCER IN FIRST DEGREE RELATIVE: ICD-10-CM

## 2021-12-09 PROCEDURE — 77063 BREAST TOMOSYNTHESIS BI: CPT | Mod: TC | Performed by: RADIOLOGY

## 2021-12-09 PROCEDURE — 77067 SCR MAMMO BI INCL CAD: CPT | Mod: TC | Performed by: RADIOLOGY

## 2022-04-22 ENCOUNTER — VIRTUAL VISIT (OUTPATIENT)
Dept: FAMILY MEDICINE | Facility: CLINIC | Age: 52
End: 2022-04-22
Payer: COMMERCIAL

## 2022-04-22 ENCOUNTER — NURSE TRIAGE (OUTPATIENT)
Dept: FAMILY MEDICINE | Facility: CLINIC | Age: 52
End: 2022-04-22
Payer: COMMERCIAL

## 2022-04-22 DIAGNOSIS — R19.00 ABDOMINAL MASS, UNSPECIFIED ABDOMINAL LOCATION: Primary | ICD-10-CM

## 2022-04-22 PROCEDURE — 99213 OFFICE O/P EST LOW 20 MIN: CPT | Mod: TEL | Performed by: NURSE PRACTITIONER

## 2022-04-22 NOTE — PROGRESS NOTES
Fiona is a 51 year old who is being evaluated via a billable telephone visit.      What phone number would you like to be contacted at?   How would you like to obtain your AVS? MyChart    Assessment & Plan     Abdominal mass, unspecified abdominal location  Asymptomatic, noticed incidentally by another provider.  Per pt, concern was regarding AAA.  US ordered.  No additional risk factors.  - US Abdomen Complete; Future           No follow-ups on file.    Martha Valera, VALENTINE CNP  [facility]    Estelita Jaimes is a 51 year old who presents for the following health issues     HPI     Recent visit through employer.  Stress test, labs, exam.  Provider noted a pulsating mass above the umbilicus and talked about further eval to rule out an aortic aneurysm with the pt.  She denies any chest pain, breathing issues, lower extremity swelling.  She does not exercise but climbs stairs without cardiac symptoms.  No smoking.        Review of Systems   Constitutional, HEENT, cardiovascular, pulmonary, gi and gu systems are negative, except as otherwise noted.      Objective           Vitals:  No vitals were obtained today due to virtual visit.    Physical Exam   healthy, alert and no distress  PSYCH: Alert and oriented times 3; coherent speech, normal   rate and volume, able to articulate logical thoughts, able   to abstract reason, no tangential thoughts, no hallucinations   or delusions  Her affect is normal  RESP: No cough, no audible wheezing, able to talk in full sentences  Remainder of exam unable to be completed due to telephone visits                Phone call duration: 7 minutes

## 2022-04-29 ENCOUNTER — HOSPITAL ENCOUNTER (OUTPATIENT)
Dept: ULTRASOUND IMAGING | Facility: CLINIC | Age: 52
Discharge: HOME | End: 2022-04-29
Attending: NURSE PRACTITIONER | Admitting: NURSE PRACTITIONER
Payer: COMMERCIAL

## 2022-04-29 DIAGNOSIS — R19.00 ABDOMINAL MASS, UNSPECIFIED ABDOMINAL LOCATION: ICD-10-CM

## 2022-04-29 PROCEDURE — 76775 US EXAM ABDO BACK WALL LIM: CPT

## 2022-06-21 ENCOUNTER — OFFICE VISIT (OUTPATIENT)
Dept: FAMILY MEDICINE | Facility: CLINIC | Age: 52
End: 2022-06-21
Payer: COMMERCIAL

## 2022-06-21 VITALS
BODY MASS INDEX: 28.39 KG/M2 | DIASTOLIC BLOOD PRESSURE: 80 MMHG | HEART RATE: 60 BPM | TEMPERATURE: 98 F | SYSTOLIC BLOOD PRESSURE: 104 MMHG | WEIGHT: 159 LBS | RESPIRATION RATE: 16 BRPM

## 2022-06-21 DIAGNOSIS — Z12.11 COLON CANCER SCREENING: ICD-10-CM

## 2022-06-21 DIAGNOSIS — F32.81 PMDD (PREMENSTRUAL DYSPHORIC DISORDER): ICD-10-CM

## 2022-06-21 DIAGNOSIS — Z86.2 HX OF IRON DEFICIENCY ANEMIA: ICD-10-CM

## 2022-06-21 DIAGNOSIS — E03.8 SUBCLINICAL HYPOTHYROIDISM: ICD-10-CM

## 2022-06-21 DIAGNOSIS — Z11.59 NEED FOR HEPATITIS C SCREENING TEST: ICD-10-CM

## 2022-06-21 DIAGNOSIS — E04.1 THYROID NODULE: Primary | ICD-10-CM

## 2022-06-21 DIAGNOSIS — Z23 NEED FOR SHINGLES VACCINE: ICD-10-CM

## 2022-06-21 DIAGNOSIS — R53.83 FATIGUE, UNSPECIFIED TYPE: ICD-10-CM

## 2022-06-21 LAB
BASOPHILS # BLD AUTO: 0 10E3/UL (ref 0–0.2)
BASOPHILS NFR BLD AUTO: 1 %
DEPRECATED CALCIDIOL+CALCIFEROL SERPL-MC: 34 UG/L (ref 20–75)
EOSINOPHIL # BLD AUTO: 0.1 10E3/UL (ref 0–0.7)
EOSINOPHIL NFR BLD AUTO: 4 %
ERYTHROCYTE [DISTWIDTH] IN BLOOD BY AUTOMATED COUNT: 15.6 % (ref 10–15)
FOLATE SERPL-MCNC: 14.5 NG/ML (ref 4.6–34.8)
HCT VFR BLD AUTO: 34.5 % (ref 35–47)
HCV AB SERPL QL IA: NONREACTIVE
HGB BLD-MCNC: 10.9 G/DL (ref 11.7–15.7)
LYMPHOCYTES # BLD AUTO: 1 10E3/UL (ref 0.8–5.3)
LYMPHOCYTES NFR BLD AUTO: 38 %
MCH RBC QN AUTO: 26.7 PG (ref 26.5–33)
MCHC RBC AUTO-ENTMCNC: 31.6 G/DL (ref 31.5–36.5)
MCV RBC AUTO: 84 FL (ref 78–100)
MONOCYTES # BLD AUTO: 0.3 10E3/UL (ref 0–1.3)
MONOCYTES NFR BLD AUTO: 10 %
NEUTROPHILS # BLD AUTO: 1.2 10E3/UL (ref 1.6–8.3)
NEUTROPHILS NFR BLD AUTO: 47 %
PLATELET # BLD AUTO: 231 10E3/UL (ref 150–450)
RBC # BLD AUTO: 4.09 10E6/UL (ref 3.8–5.2)
T3FREE SERPL-MCNC: 2.9 PG/ML (ref 2–4.4)
VIT B12 SERPL-MCNC: 501 PG/ML (ref 232–1245)
WBC # BLD AUTO: 2.6 10E3/UL (ref 4–11)

## 2022-06-21 PROCEDURE — 84439 ASSAY OF FREE THYROXINE: CPT | Performed by: FAMILY MEDICINE

## 2022-06-21 PROCEDURE — 86376 MICROSOMAL ANTIBODY EACH: CPT | Performed by: FAMILY MEDICINE

## 2022-06-21 PROCEDURE — 36415 COLL VENOUS BLD VENIPUNCTURE: CPT | Performed by: FAMILY MEDICINE

## 2022-06-21 PROCEDURE — 82746 ASSAY OF FOLIC ACID SERUM: CPT | Performed by: FAMILY MEDICINE

## 2022-06-21 PROCEDURE — 90750 HZV VACC RECOMBINANT IM: CPT | Performed by: FAMILY MEDICINE

## 2022-06-21 PROCEDURE — 80050 GENERAL HEALTH PANEL: CPT | Performed by: FAMILY MEDICINE

## 2022-06-21 PROCEDURE — 83550 IRON BINDING TEST: CPT | Performed by: FAMILY MEDICINE

## 2022-06-21 PROCEDURE — 90471 IMMUNIZATION ADMIN: CPT | Performed by: FAMILY MEDICINE

## 2022-06-21 PROCEDURE — 82306 VITAMIN D 25 HYDROXY: CPT | Performed by: FAMILY MEDICINE

## 2022-06-21 PROCEDURE — 82607 VITAMIN B-12: CPT | Performed by: FAMILY MEDICINE

## 2022-06-21 PROCEDURE — 99215 OFFICE O/P EST HI 40 MIN: CPT | Mod: 25 | Performed by: FAMILY MEDICINE

## 2022-06-21 PROCEDURE — 86803 HEPATITIS C AB TEST: CPT | Performed by: FAMILY MEDICINE

## 2022-06-21 PROCEDURE — 84481 FREE ASSAY (FT-3): CPT | Performed by: FAMILY MEDICINE

## 2022-06-21 PROCEDURE — 82728 ASSAY OF FERRITIN: CPT | Performed by: FAMILY MEDICINE

## 2022-06-21 RX ORDER — METRONIDAZOLE 7.5 MG/G
LOTION TOPICAL
COMMUNITY
Start: 2022-06-06

## 2022-06-21 RX ORDER — PAROXETINE 10 MG/1
10 TABLET, FILM COATED ORAL EVERY MORNING
Qty: 60 TABLET | Refills: 0 | Status: SHIPPED | OUTPATIENT
Start: 2022-06-21 | End: 2022-12-08

## 2022-06-21 RX ORDER — SPIRONOLACTONE 50 MG/1
TABLET, FILM COATED ORAL
COMMUNITY
Start: 2022-06-07 | End: 2022-12-08

## 2022-06-21 RX ORDER — MINOCYCLINE HYDROCHLORIDE 100 MG/1
TABLET ORAL
COMMUNITY
Start: 2022-05-10 | End: 2022-12-08

## 2022-06-21 ASSESSMENT — ANXIETY QUESTIONNAIRES
6. BECOMING EASILY ANNOYED OR IRRITABLE: SEVERAL DAYS
GAD7 TOTAL SCORE: 5
GAD7 TOTAL SCORE: 5
3. WORRYING TOO MUCH ABOUT DIFFERENT THINGS: SEVERAL DAYS
5. BEING SO RESTLESS THAT IT IS HARD TO SIT STILL: NOT AT ALL
1. FEELING NERVOUS, ANXIOUS, OR ON EDGE: SEVERAL DAYS
7. FEELING AFRAID AS IF SOMETHING AWFUL MIGHT HAPPEN: NOT AT ALL
2. NOT BEING ABLE TO STOP OR CONTROL WORRYING: SEVERAL DAYS
IF YOU CHECKED OFF ANY PROBLEMS ON THIS QUESTIONNAIRE, HOW DIFFICULT HAVE THESE PROBLEMS MADE IT FOR YOU TO DO YOUR WORK, TAKE CARE OF THINGS AT HOME, OR GET ALONG WITH OTHER PEOPLE: SOMEWHAT DIFFICULT

## 2022-06-21 ASSESSMENT — PATIENT HEALTH QUESTIONNAIRE - PHQ9
5. POOR APPETITE OR OVEREATING: SEVERAL DAYS
SUM OF ALL RESPONSES TO PHQ QUESTIONS 1-9: 3

## 2022-06-21 NOTE — PROGRESS NOTES
Assessment & Plan     Thyroid nodule  - re-evaluate with thyroid ultrasound   - US Thyroid; Future    Subclinical hypothyroidism  - unclear why synthroid did not really help. Last check TSH was slightly elevated but will recheck  - TSH; Future  - T4, free; Future  - T3, Free; Future  - Thyroid peroxidase antibody; Future  - Comprehensive metabolic panel (BMP + Alb, Alk Phos, ALT, AST, Total. Bili, TP); Future  - TSH  - T4, free  - T3, Free  - Thyroid peroxidase antibody  - Comprehensive metabolic panel (BMP + Alb, Alk Phos, ALT, AST, Total. Bili, TP)    Hx of iron deficiency anemia  - Iron and iron binding capacity; Future  - Ferritin; Future  - CBC with platelets and differential; Future  - Vitamin B12; Future  - Folate; Future  - Iron and iron binding capacity  - Ferritin  - CBC with platelets and differential  - Vitamin B12  - Folate    Fatigue, unspecified type  - long standing issue   - Vitamin D Deficiency; Future  - Vitamin D Deficiency    PMDD (premenstrual dysphoric disorder)  - long discussion with patient regarding her long standing issue of PMDD for close to 20 years. Will start on low dose of paxil.   - PARoxetine (PAXIL) 10 MG tablet; Take 1 tablet (10 mg) by mouth every morning For 2 weeks then increase to 20 mg daily    Colon cancer screening  - Adult Gastro Ref - Procedure Only; Future    Need for hepatitis C screening test  - Hepatitis C Screen Reflex to HCV RNA Quant and Genotype; Future  - Hepatitis C Screen Reflex to HCV RNA Quant and Genotype    Need for shingles vaccine  - ZOSTER VACCINE RECOMBINANT ADJUVANTED IM NJX      45 minutes spent on the date of the encounter doing chart review, history and exam, documentation and further activities per the note       See Patient Instructions    Return in about 5 weeks (around 7/26/2022) for medication recheck, virtual, with your primary care physician.    Mary Brooks MD  [facility]    Estelita Jaimes is  a 51 year old, presenting for the following health issues:  Thyroid Disease, Fatigue, Anxiety, and Hoarse (Having hoarseness on and off, does have hx of nodules, has been about 8 years since the nodules have been looked at)      History of Present Illness       Hypothyroidism:     Since last visit, patient describes the following symptoms::  Anxiety and Fatigue    She eats 2-3 servings of fruits and vegetables daily.She consumes 0 sweetened beverage(s) daily.She exercises with enough effort to increase her heart rate 30 to 60 minutes per day.  She exercises with enough effort to increase her heart rate 4 days per week.   She is taking medications regularly.     Abnormal Mood Symptoms  Onset/Duration: 6 weeks  Description: anxiety  Depression (if yes, do PHQ-9): YES- little  Anxiety (if yes, do SHAUN-7): YES  Accompanying Signs & Symptoms:  Still participating in activities that you used to enjoy: YES  Fatigue: YES  Irritability: YES  Difficulty concentrating: YES  Changes in appetite: no  Problems with sleep: no  Heart racing/beating fast: no  Abnormally elevated, expansive, or irritable mood: no  Persistently increased activity or energy: no  Thoughts of hurting yourself or others: no  History:  Recent stress or major life event: no-has stopped drinking last Feb, does have supportive spouse  Prior depression or anxiety: None  Family history of depression or anxiety: no  Alcohol/drug use: YES- just stopped alcohol use in Feb  Difficulty sleeping: no  Precipitating or alleviating factors: None  Therapies tried and outcome: individual therapy and lifestyle changes  No flowsheet data found.  No flowsheet data found.    She used to be on synthroid in 2015 but stopped taking it because she did not feel like it was working.   Her symptoms of fatigue and voice hoarseness seem to be intermittent and has been this one for the past 15 years.   States without fail there is a time in February/winter/post holidays where her  fatigue and hoarseness was worse.   Donates blood every 8 weeks or so regularly for the last 18 months.   Since this past February she quit drinking completely because she wanted to change that behavior. She is just starting therapy and wants to understand more about addiction as it run in her family.     She also notes a few days before her period she is more irritable than normal and feels much better once her period is done she feels fine. States its been like this for about 20 years but she has never had it addressed.       LMP- 6/11/22 (lasts about 2-3 days)           Review of Systems   Constitutional, HEENT, cardiovascular, pulmonary, GI, , musculoskeletal, neuro, skin, endocrine and psych systems are negative, except as otherwise noted.      Objective    /80 (BP Location: Left arm, Patient Position: Chair, Cuff Size: Adult Regular)   Pulse 60   Temp 98  F (36.7  C) (Oral)   Resp 16   Wt 72.1 kg (159 lb)   BMI 28.39 kg/m    Body mass index is 28.39 kg/m .  Physical Exam   GENERAL: healthy, alert and no distress  RESP: lungs clear to auscultation - no rales, rhonchi or wheezes  CV: regular rate and rhythm, normal S1 S2, no S3 or S4, no murmur, click or rub, no peripheral edema and peripheral pulses strong  MS: no gross musculoskeletal defects noted, no edema  PSYCH: mentation appears normal, affect normal/bright              .  ..

## 2022-06-22 LAB
ALBUMIN SERPL-MCNC: 3.6 G/DL (ref 3.4–5)
ALP SERPL-CCNC: 55 U/L (ref 40–150)
ALT SERPL W P-5'-P-CCNC: 22 U/L (ref 0–50)
ANION GAP SERPL CALCULATED.3IONS-SCNC: 1 MMOL/L (ref 3–14)
AST SERPL W P-5'-P-CCNC: 20 U/L (ref 0–45)
BILIRUB SERPL-MCNC: 0.5 MG/DL (ref 0.2–1.3)
BUN SERPL-MCNC: 12 MG/DL (ref 7–30)
CALCIUM SERPL-MCNC: 8.6 MG/DL (ref 8.5–10.1)
CHLORIDE BLD-SCNC: 105 MMOL/L (ref 94–109)
CO2 SERPL-SCNC: 31 MMOL/L (ref 20–32)
CREAT SERPL-MCNC: 0.7 MG/DL (ref 0.52–1.04)
FERRITIN SERPL-MCNC: 4 NG/ML (ref 8–252)
GFR SERPL CREATININE-BSD FRML MDRD: >90 ML/MIN/1.73M2
GLUCOSE BLD-MCNC: 71 MG/DL (ref 70–99)
IRON SATN MFR SERPL: 7 % (ref 15–46)
IRON SERPL-MCNC: 25 UG/DL (ref 35–180)
POTASSIUM BLD-SCNC: 4.5 MMOL/L (ref 3.4–5.3)
PROT SERPL-MCNC: 6.5 G/DL (ref 6.8–8.8)
SODIUM SERPL-SCNC: 137 MMOL/L (ref 133–144)
T4 FREE SERPL-MCNC: 0.81 NG/DL (ref 0.76–1.46)
THYROPEROXIDASE AB SERPL-ACNC: 808 IU/ML
TIBC SERPL-MCNC: 350 UG/DL (ref 240–430)
TSH SERPL DL<=0.005 MIU/L-ACNC: 5.06 MU/L (ref 0.4–4)

## 2022-06-23 ENCOUNTER — HOSPITAL ENCOUNTER (OUTPATIENT)
Dept: ULTRASOUND IMAGING | Facility: CLINIC | Age: 52
Discharge: HOME | End: 2022-06-23
Attending: FAMILY MEDICINE | Admitting: FAMILY MEDICINE
Payer: COMMERCIAL

## 2022-06-23 DIAGNOSIS — E04.1 THYROID NODULE: ICD-10-CM

## 2022-06-23 DIAGNOSIS — E03.8 SUBCLINICAL HYPOTHYROIDISM: Primary | ICD-10-CM

## 2022-06-23 PROCEDURE — 76536 US EXAM OF HEAD AND NECK: CPT

## 2022-06-23 RX ORDER — LEVOTHYROXINE SODIUM 50 UG/1
50 TABLET ORAL DAILY
Qty: 90 TABLET | Refills: 0 | Status: SHIPPED | OUTPATIENT
Start: 2022-06-23 | End: 2022-09-21

## 2022-07-29 ENCOUNTER — MYC MEDICAL ADVICE (OUTPATIENT)
Dept: FAMILY MEDICINE | Facility: CLINIC | Age: 52
End: 2022-07-29

## 2022-08-02 ENCOUNTER — TELEPHONE (OUTPATIENT)
Dept: NURSING | Facility: CLINIC | Age: 52
End: 2022-08-02

## 2022-08-02 NOTE — TELEPHONE ENCOUNTER
Patient is calling back and states that she does not take the medication Paroxetine.  Patient states that she is currently speaking with a counselor and that is helping her fine.  At this point Stiaci does not want medication she wants to keep speaking with her counselor.   Patient states that there is no need to schedule currently.      Nicki Xavier RN/FNA

## 2022-09-15 ENCOUNTER — ALLIED HEALTH/NURSE VISIT (OUTPATIENT)
Dept: FAMILY MEDICINE | Facility: CLINIC | Age: 52
End: 2022-09-15
Payer: COMMERCIAL

## 2022-09-15 DIAGNOSIS — Z23 NEED FOR SHINGLES VACCINE: Primary | ICD-10-CM

## 2022-09-15 PROCEDURE — 90471 IMMUNIZATION ADMIN: CPT

## 2022-09-15 PROCEDURE — 99207 PR NO CHARGE NURSE ONLY: CPT

## 2022-09-15 PROCEDURE — 90750 HZV VACC RECOMBINANT IM: CPT

## 2022-09-20 DIAGNOSIS — E03.8 SUBCLINICAL HYPOTHYROIDISM: ICD-10-CM

## 2022-09-21 RX ORDER — LEVOTHYROXINE SODIUM 50 UG/1
50 TABLET ORAL DAILY
Qty: 90 TABLET | Refills: 0 | Status: SHIPPED | OUTPATIENT
Start: 2022-09-21 | End: 2022-12-08

## 2022-09-21 NOTE — TELEPHONE ENCOUNTER
See note, refill extended  Prescription approved per [facility] Refill Protocol.  Nicki Kelly RN, BSN  [facility]

## 2022-09-21 NOTE — TELEPHONE ENCOUNTER
Patient calling as bottle says no refills.  Let her know we did receive request from her pharmacy.  Aware she is due for physical 10/28/22.  Has 1 1/2 weeks of medications left.  She will schedule her physical soon.  Jennifer Ding RN

## 2022-12-07 SDOH — ECONOMIC STABILITY: INCOME INSECURITY: IN THE LAST 12 MONTHS, WAS THERE A TIME WHEN YOU WERE NOT ABLE TO PAY THE MORTGAGE OR RENT ON TIME?: PATIENT REFUSED

## 2022-12-07 SDOH — ECONOMIC STABILITY: FOOD INSECURITY: WITHIN THE PAST 12 MONTHS, THE FOOD YOU BOUGHT JUST DIDN'T LAST AND YOU DIDN'T HAVE MONEY TO GET MORE.: NEVER TRUE

## 2022-12-07 SDOH — ECONOMIC STABILITY: INCOME INSECURITY: HOW HARD IS IT FOR YOU TO PAY FOR THE VERY BASICS LIKE FOOD, HOUSING, MEDICAL CARE, AND HEATING?: NOT VERY HARD

## 2022-12-07 SDOH — HEALTH STABILITY: PHYSICAL HEALTH: ON AVERAGE, HOW MANY MINUTES DO YOU ENGAGE IN EXERCISE AT THIS LEVEL?: 30 MIN

## 2022-12-07 SDOH — ECONOMIC STABILITY: TRANSPORTATION INSECURITY
IN THE PAST 12 MONTHS, HAS LACK OF TRANSPORTATION KEPT YOU FROM MEETINGS, WORK, OR FROM GETTING THINGS NEEDED FOR DAILY LIVING?: NO

## 2022-12-07 SDOH — ECONOMIC STABILITY: FOOD INSECURITY: WITHIN THE PAST 12 MONTHS, YOU WORRIED THAT YOUR FOOD WOULD RUN OUT BEFORE YOU GOT MONEY TO BUY MORE.: NEVER TRUE

## 2022-12-07 SDOH — HEALTH STABILITY: PHYSICAL HEALTH: ON AVERAGE, HOW MANY DAYS PER WEEK DO YOU ENGAGE IN MODERATE TO STRENUOUS EXERCISE (LIKE A BRISK WALK)?: 4 DAYS

## 2022-12-07 SDOH — ECONOMIC STABILITY: TRANSPORTATION INSECURITY
IN THE PAST 12 MONTHS, HAS THE LACK OF TRANSPORTATION KEPT YOU FROM MEDICAL APPOINTMENTS OR FROM GETTING MEDICATIONS?: NO

## 2022-12-07 ASSESSMENT — ENCOUNTER SYMPTOMS
WEAKNESS: 0
FEVER: 0
SORE THROAT: 0
JOINT SWELLING: 0
PALPITATIONS: 0
DYSURIA: 0
ARTHRALGIAS: 0
MYALGIAS: 0
ABDOMINAL PAIN: 0
HEADACHES: 0
DIARRHEA: 0
HEARTBURN: 0
CONSTIPATION: 0
BREAST MASS: 0
NAUSEA: 0
HEMATOCHEZIA: 0
EYE PAIN: 0
DIZZINESS: 0
FREQUENCY: 0
SHORTNESS OF BREATH: 0
NERVOUS/ANXIOUS: 0
HEMATURIA: 0
CHILLS: 0
PARESTHESIAS: 0
COUGH: 0

## 2022-12-07 ASSESSMENT — SOCIAL DETERMINANTS OF HEALTH (SDOH)
HOW OFTEN DO YOU GET TOGETHER WITH FRIENDS OR RELATIVES?: TWICE A WEEK
IN A TYPICAL WEEK, HOW MANY TIMES DO YOU TALK ON THE PHONE WITH FAMILY, FRIENDS, OR NEIGHBORS?: MORE THAN THREE TIMES A WEEK
DO YOU BELONG TO ANY CLUBS OR ORGANIZATIONS SUCH AS CHURCH GROUPS UNIONS, FRATERNAL OR ATHLETIC GROUPS, OR SCHOOL GROUPS?: YES
HOW OFTEN DO YOU ATTEND CHURCH OR RELIGIOUS SERVICES?: MORE THAN 4 TIMES PER YEAR

## 2022-12-07 ASSESSMENT — LIFESTYLE VARIABLES
SKIP TO QUESTIONS 9-10: 1
AUDIT-C TOTAL SCORE: 0
HOW MANY STANDARD DRINKS CONTAINING ALCOHOL DO YOU HAVE ON A TYPICAL DAY: PATIENT DOES NOT DRINK
HOW OFTEN DO YOU HAVE SIX OR MORE DRINKS ON ONE OCCASION: NEVER
HOW OFTEN DO YOU HAVE A DRINK CONTAINING ALCOHOL: NEVER

## 2022-12-08 ENCOUNTER — OFFICE VISIT (OUTPATIENT)
Dept: FAMILY MEDICINE | Facility: CLINIC | Age: 52
End: 2022-12-08
Payer: COMMERCIAL

## 2022-12-08 VITALS
HEART RATE: 68 BPM | OXYGEN SATURATION: 97 % | RESPIRATION RATE: 12 BRPM | DIASTOLIC BLOOD PRESSURE: 70 MMHG | HEIGHT: 62 IN | WEIGHT: 155 LBS | BODY MASS INDEX: 28.52 KG/M2 | SYSTOLIC BLOOD PRESSURE: 102 MMHG | TEMPERATURE: 97.5 F

## 2022-12-08 DIAGNOSIS — I73.00 RAYNAUD'S DISEASE WITHOUT GANGRENE: ICD-10-CM

## 2022-12-08 DIAGNOSIS — Z12.31 VISIT FOR SCREENING MAMMOGRAM: ICD-10-CM

## 2022-12-08 DIAGNOSIS — Z13.1 SCREENING FOR DIABETES MELLITUS: ICD-10-CM

## 2022-12-08 DIAGNOSIS — Z12.4 CERVICAL CANCER SCREENING: ICD-10-CM

## 2022-12-08 DIAGNOSIS — E03.8 SUBCLINICAL HYPOTHYROIDISM: ICD-10-CM

## 2022-12-08 DIAGNOSIS — Z00.00 ROUTINE GENERAL MEDICAL EXAMINATION AT A HEALTH CARE FACILITY: Primary | ICD-10-CM

## 2022-12-08 DIAGNOSIS — Z12.11 COLON CANCER SCREENING: ICD-10-CM

## 2022-12-08 DIAGNOSIS — Z13.220 LIPID SCREENING: ICD-10-CM

## 2022-12-08 LAB
ANION GAP SERPL CALCULATED.3IONS-SCNC: 9 MMOL/L (ref 7–15)
BUN SERPL-MCNC: 9.6 MG/DL (ref 6–20)
CALCIUM SERPL-MCNC: 9.8 MG/DL (ref 8.6–10)
CHLORIDE SERPL-SCNC: 101 MMOL/L (ref 98–107)
CHOLEST SERPL-MCNC: 205 MG/DL
CREAT SERPL-MCNC: 0.73 MG/DL (ref 0.51–0.95)
DEPRECATED HCO3 PLAS-SCNC: 28 MMOL/L (ref 22–29)
GFR SERPL CREATININE-BSD FRML MDRD: >90 ML/MIN/1.73M2
GLUCOSE SERPL-MCNC: 84 MG/DL (ref 70–99)
HDLC SERPL-MCNC: 73 MG/DL
LDLC SERPL CALC-MCNC: 121 MG/DL
NONHDLC SERPL-MCNC: 132 MG/DL
POTASSIUM SERPL-SCNC: 5 MMOL/L (ref 3.4–5.3)
SODIUM SERPL-SCNC: 138 MMOL/L (ref 136–145)
TRIGL SERPL-MCNC: 54 MG/DL
TSH SERPL DL<=0.005 MIU/L-ACNC: 1.13 UIU/ML (ref 0.3–4.2)

## 2022-12-08 PROCEDURE — 36415 COLL VENOUS BLD VENIPUNCTURE: CPT | Performed by: NURSE PRACTITIONER

## 2022-12-08 PROCEDURE — 80061 LIPID PANEL: CPT | Performed by: NURSE PRACTITIONER

## 2022-12-08 PROCEDURE — 0134A COVID-19 VACCINE BIVALENT BOOSTER 18+ (MODERNA): CPT | Performed by: NURSE PRACTITIONER

## 2022-12-08 PROCEDURE — 87624 HPV HI-RISK TYP POOLED RSLT: CPT | Performed by: NURSE PRACTITIONER

## 2022-12-08 PROCEDURE — 91313 COVID-19 VACCINE BIVALENT BOOSTER 18+ (MODERNA): CPT | Performed by: NURSE PRACTITIONER

## 2022-12-08 PROCEDURE — G0145 SCR C/V CYTO,THINLAYER,RESCR: HCPCS | Performed by: NURSE PRACTITIONER

## 2022-12-08 PROCEDURE — 90471 IMMUNIZATION ADMIN: CPT | Performed by: NURSE PRACTITIONER

## 2022-12-08 PROCEDURE — 90682 RIV4 VACC RECOMBINANT DNA IM: CPT | Performed by: NURSE PRACTITIONER

## 2022-12-08 PROCEDURE — 80048 BASIC METABOLIC PNL TOTAL CA: CPT | Performed by: NURSE PRACTITIONER

## 2022-12-08 PROCEDURE — 84443 ASSAY THYROID STIM HORMONE: CPT | Performed by: NURSE PRACTITIONER

## 2022-12-08 PROCEDURE — 99214 OFFICE O/P EST MOD 30 MIN: CPT | Mod: 25 | Performed by: NURSE PRACTITIONER

## 2022-12-08 PROCEDURE — 99396 PREV VISIT EST AGE 40-64: CPT | Mod: 25 | Performed by: NURSE PRACTITIONER

## 2022-12-08 RX ORDER — AMLODIPINE BESYLATE 2.5 MG/1
2.5 TABLET ORAL DAILY
Qty: 90 TABLET | Refills: 3 | Status: SHIPPED | OUTPATIENT
Start: 2022-12-08 | End: 2023-12-11

## 2022-12-08 RX ORDER — LEVOTHYROXINE SODIUM 50 UG/1
50 TABLET ORAL DAILY
Qty: 90 TABLET | Refills: 3 | Status: SHIPPED | OUTPATIENT
Start: 2022-12-08 | End: 2023-12-11

## 2022-12-08 ASSESSMENT — ENCOUNTER SYMPTOMS
HEARTBURN: 0
WEAKNESS: 0
EYE PAIN: 0
JOINT SWELLING: 0
NERVOUS/ANXIOUS: 0
ARTHRALGIAS: 0
DYSURIA: 0
COUGH: 0
BREAST MASS: 0
CHILLS: 0
PALPITATIONS: 0
CONSTIPATION: 0
DIARRHEA: 0
FEVER: 0
FREQUENCY: 0
DIZZINESS: 0
SORE THROAT: 0
HEADACHES: 0
SHORTNESS OF BREATH: 0
ABDOMINAL PAIN: 0
NAUSEA: 0
PARESTHESIAS: 0
HEMATURIA: 0
HEMATOCHEZIA: 0
MYALGIAS: 0

## 2022-12-08 NOTE — PROGRESS NOTES
SUBJECTIVE:   CC: Fiona is an 52 year old who presents for preventive health visit.     Patient has been advised of split billing requirements and indicates understanding: Yes  Healthy Habits:     Getting at least 3 servings of Calcium per day:  NO    Bi-annual eye exam:  Yes    Dental care twice a year:  Yes    Sleep apnea or symptoms of sleep apnea:  None    Diet:  Regular (no restrictions)    Frequency of exercise:  2-3 days/week    Duration of exercise:  30-45 minutes    Taking medications regularly:  Yes    Medication side effects:  None    PHQ-2 Total Score: 0    Additional concerns today:  No    Concerns for raynaud's. Did not use medication prescribed in past.  Feels worsening symptoms in winter.   Would like to try medication now     Today's PHQ-2 Score:   PHQ-2 ( 1999 Pf[employer]) 12/7/2022   Q1: Little interest or pleasure in doing things 0   Q2: Feeling down, depressed or hopeless 0   PHQ-2 Score 0   PHQ-2 Total Score (12-17 Years)- Positive if 3 or more points; Administer PHQ-A if positive -   Q1: Little interest or pleasure in doing things Not at all   Q2: Feeling down, depressed or hopeless Not at all   PHQ-2 Score 0         Social History     Tobacco Use     Smoking status: Never     Smokeless tobacco: Never   Substance Use Topics     Alcohol use: Yes     Alcohol/week: 2.0 standard drinks     Types: 2 Glasses of wine per week     Comment: occ       Alcohol Use 12/7/2022   Prescreen: >3 drinks/day or >7 drinks/week? Not Applicable   Prescreen: >3 drinks/day or >7 drinks/week? -     Reviewed orders with patient.  Reviewed health maintenance and updated orders accordingly - Yes  Lab work is in process  Labs reviewed in EPIC    Breast Cancer Screening:    FHS-7:   Breast CA Risk Assessment (FHS-7) 10/28/2021 12/9/2021 12/7/2022   Did any of your first-degree relatives have breast or ovarian cancer? Yes Yes Yes   Did any of your relatives have bilateral breast cancer? No No Unknown   Did any man in your  family have breast cancer? No No No   Did any woman in your family have breast and ovarian cancer? Yes No Yes   Did any woman in your family have breast cancer before age 50 y? No No Yes   Do you have 2 or more relatives with breast and/or ovarian cancer? Yes No Yes   Do you have 2 or more relatives with breast and/or bowel cancer? Yes No Yes       Mammogram Screening: Recommended annual mammography  Pertinent mammograms are reviewed under the imaging tab.    History of abnormal Pap smear:   PAP / HPV Latest Ref Rng & Units 10/28/2021 12/1/2017 10/26/2016   PAP   Negative for Intraepithelial Lesion or Malignancy (NILM) - -   PAP (Historical) - - NIL NIL   HPV16 Negative Negative Negative Negative   HPV18 Negative Negative Negative Negative   HRHPV Negative Positive(A) Negative Positive(A)     Reviewed and updated as needed this visit by clinical staff   Tobacco  Allergies  Meds  Problems  Med Hx  Surg Hx  Fam Hx          Reviewed and updated as needed this visit by Provider   Tobacco  Allergies  Meds  Problems  Med Hx  Surg Hx  Fam Hx         Past Medical History:   Diagnosis Date     Cervical high risk HPV (human papillomavirus) test positive 10/26/16    (not 16 or 18)     Subclinical hypothyroidism      Thyroid nodule       Past Surgical History:   Procedure Laterality Date     DENTAL SURGERY  1994    wisdom teeth     FOOT SURGERY Left 1995    Bunion       Review of Systems   Constitutional: Negative for chills and fever.   HENT: Negative for congestion, ear pain, hearing loss and sore throat.    Eyes: Negative for pain and visual disturbance.   Respiratory: Negative for cough and shortness of breath.    Cardiovascular: Negative for chest pain, palpitations and peripheral edema.   Gastrointestinal: Negative for abdominal pain, constipation, diarrhea, heartburn, hematochezia and nausea.   Breasts:  Negative for tenderness, breast mass and discharge.   Genitourinary: Negative for dysuria, frequency,  "genital sores, hematuria, pelvic pain, urgency, vaginal bleeding and vaginal discharge.   Musculoskeletal: Negative for arthralgias, joint swelling and myalgias.   Skin: Negative for rash.   Neurological: Negative for dizziness, weakness, headaches and paresthesias.   Psychiatric/Behavioral: Negative for mood changes. The patient is not nervous/anxious.           OBJECTIVE:   /70 (BP Location: Right arm, Patient Position: Chair, Cuff Size: Adult Regular)   Pulse 68   Temp 97.5  F (36.4  C) (Oral)   Resp 12   Ht 1.575 m (5' 2\")   Wt 70.3 kg (155 lb)   LMP 11/01/2022 (Approximate)   SpO2 97%   BMI 28.35 kg/m    Physical Exam  GENERAL: healthy, alert and no distress  EYES: Eyes grossly normal to inspection, PERRL and conjunctivae and sclerae normal  HENT: ear canals and TM's normal, nose and mouth without ulcers or lesions  NECK: no adenopathy, no asymmetry, masses, or scars and thyroid normal to palpation  RESP: lungs clear to auscultation - no rales, rhonchi or wheezes  BREAST: normal without masses, tenderness or nipple discharge and no palpable axillary masses or adenopathy  CV: regular rate and rhythm, normal S1 S2, no S3 or S4, no murmur, click or rub, no peripheral edema and peripheral pulses strong  ABDOMEN: soft, nontender, no hepatosplenomegaly, no masses and bowel sounds normal   (female): normal female external genitalia, normal urethral meatus, vaginal mucosa pink, moist, well rugated, and normal cervix/adnexa/uterus without masses or discharge  MS: no gross musculoskeletal defects noted, no edema  SKIN: no suspicious lesions or rashes  NEURO: Normal strength and tone, mentation intact and speech normal  PSYCH: mentation appears normal, affect normal/bright    Diagnostic Test Results:  Labs reviewed in Epic    ASSESSMENT/PLAN:   Fiona was seen today for physical.    Diagnoses and all orders for this visit:    Routine general medical examination at a health care facility  HM updated. "     Colon cancer screening  -     Colonoscopy Screening  Referral; Future    Cervical cancer screening  -     Pap Screen with HPV - recommended age 30 - 65 years    Visit for screening mammogram  -     MA SCREENING DIGITAL BILAT - Future  (s+30); Future  + family history.     Raynaud's disease without gangrene  -     amLODIPine (NORVASC) 2.5 MG tablet; Take 1 tablet (2.5 mg) by mouth daily  Discussed treatment options and will trial amlodipine as recommended in past. Discussed medication use, risks, benefits, and side effects.     Subclinical hypothyroidism  -     levothyroxine (SYNTHROID/LEVOTHROID) 50 MCG tablet; Take 1 tablet (50 mcg) by mouth daily  -     TSH with free T4 reflex; Future  -     TSH with free T4 reflex  Asymptomatic.     Lipid screening  -     Lipid panel reflex to direct LDL Non-fasting; Future  -     Lipid panel reflex to direct LDL Non-fasting    Screening for diabetes mellitus  -     Basic metabolic panel  (Ca, Cl, CO2, Creat, Gluc, K, Na, BUN); Future  -     Basic metabolic panel  (Ca, Cl, CO2, Creat, Gluc, K, Na, BUN)    Other orders  -     INFLUENZA QUAD, RECOMBINANT, P-FREE (RIV4) (FLUBLOK)  -     REVIEW OF HEALTH MAINTENANCE PROTOCOL ORDERS  -     COVID-19,PF,MODERNA BIVALENT (18+YRS)  -     PRIMARY CARE FOLLOW-UP SCHEDULING; Future        Patient has been advised of split billing requirements and indicates understanding: Yes      COUNSELING:  Reviewed preventive health counseling, as reflected in patient instructions        She reports that she has never smoked. She has never used smokeless tobacco.          VALENTINE Charles [facility]

## 2022-12-12 LAB
BKR LAB AP GYN ADEQUACY: NORMAL
BKR LAB AP GYN INTERPRETATION: NORMAL
BKR LAB AP HPV REFLEX: NORMAL
BKR LAB AP PREVIOUS ABNORMAL: NORMAL
PATH REPORT.COMMENTS IMP SPEC: NORMAL
PATH REPORT.COMMENTS IMP SPEC: NORMAL
PATH REPORT.RELEVANT HX SPEC: NORMAL

## 2022-12-14 LAB
HUMAN PAPILLOMA VIRUS 16 DNA: NEGATIVE
HUMAN PAPILLOMA VIRUS 18 DNA: NEGATIVE
HUMAN PAPILLOMA VIRUS FINAL DIAGNOSIS: NORMAL
HUMAN PAPILLOMA VIRUS OTHER HR: NEGATIVE

## 2022-12-15 ENCOUNTER — PATIENT OUTREACH (OUTPATIENT)
Dept: FAMILY MEDICINE | Facility: CLINIC | Age: 52
End: 2022-12-15

## 2022-12-19 ENCOUNTER — ANCILLARY PROCEDURE (OUTPATIENT)
Dept: MAMMOGRAPHY | Facility: CLINIC | Age: 52
End: 2022-12-19
Payer: COMMERCIAL

## 2022-12-19 DIAGNOSIS — Z12.31 VISIT FOR SCREENING MAMMOGRAM: ICD-10-CM

## 2022-12-19 PROCEDURE — 77063 BREAST TOMOSYNTHESIS BI: CPT | Mod: TC | Performed by: STUDENT IN AN ORGANIZED HEALTH CARE EDUCATION/TRAINING PROGRAM

## 2022-12-19 PROCEDURE — 77067 SCR MAMMO BI INCL CAD: CPT | Mod: TC | Performed by: STUDENT IN AN ORGANIZED HEALTH CARE EDUCATION/TRAINING PROGRAM

## 2023-01-10 ENCOUNTER — TELEPHONE (OUTPATIENT)
Dept: GASTROENTEROLOGY | Facility: CLINIC | Age: 53
End: 2023-01-10

## 2023-01-10 NOTE — TELEPHONE ENCOUNTER
"    Screening Questions  BLUE  KIND OF PREP RED  LOCATION [review exclusion criteria] GREEN  SEDATION TYPE        y Are you active on mychart?       Jennie Ordering/Referring Provider?        [facility] What type of coverage do you have?      n Have you had a positive covid test in the last 14 days?     27.4 1. BMI  [BMI 40+ - review exclusion criteria]    y  2. Are you able to give consent for your medical care? [IF NO,RN REVIEW]          n  3. Are you taking any prescription pain medications on a routine schedule   (ex narcotics: tramadol, oxycodone, roxicodone, oxycontin,  and percocet)?        n  3a. EXTENDED PREP What kind of prescription?     n 4. Do you have any chemical dependencies such as alcohol, street drugs, or methadone?        **If yes 3- 5 , please schedule with MAC sedation.**          IF YES TO ANY 6 - 10 - HOSPITAL SETTING ONLY.     n 6.   Do you need assistance transferring?     n 7.   Have you had a heart or lung transplant?    n 8.   Are you currently on dialysis?   n 9.   Do you use daily home oxygen?   n 10. Do you take nitroglycerin?   10a. n If yes, how often?     11. [FEMALES]  n Are you currently pregnant?    11a. n If yes, how many weeks? [ Greater than 12 weeks, OR NEEDED]    n 12. Do you have Pulmonary Hypertension? *NEED PAC APPT AT [facility]*     n 13. [review exclusion criteria]  Do you have any implantable devices in your body (pacemaker, defib, LVAD)?    n 14. In the past 6 months, have you had any heart related issues including cardiomyopathy or heart attack?     14a. n If yes, did it require cardiac stenting if so when?     n 15. Have you had a stroke or Transient ischemic attack (TIA - aka  mini stroke ) within 6 months?      n 16. Do you have mod to severe Obstructive Sleep Apnea?  [Hospital only]    n 17. Do you have SEVERE AND UNCONTROLLED asthma? *NEED PAC APPT AT [facility]*     n 18. Are you currently taking any blood thinners?     18a. If yes, inform patient to \"follow up w/ ordering " "provider for bridging instructions.\"    n 19. Do you take the medication Phentermine?    19a. If yes, \"Hold for 7 days before procedure.  Please consult your prescribing provider if you have questions about holding this medication.\"     n  20. Do you have chronic kidney disease?      n  21. Do you have a diagnosis of diabetes?     n  22. On a regular basis do you go 3-5 days between bowel movements?      23. Preferred LOCAL Pharmacy for Pre Prescription    [ LIST ONLY ONE PHARMACY]        [facility]/PHARMACY #[id number] - [facility] [id number]    - CLOSING REMINDERS -    Informed patient they will need an adult    Cannot take any type of public or medical transportation alone    Conscious Sedation- Needs  for 6 hours after the procedure       MAC/General-Needs  for 24 hours after procedure    Pre-Procedure Covid test to be completed [[facility] PCR Testing Required]    Confirmed Nurse will call to complete assessment       - SCHEDULING DETAILS -  n Hospital Setting Required? If yes, what is the exclusion?:    Humberto  Surgeon    1/29  Date of Procedure  Lower Endoscopy [Colonoscopy]  Type of Procedure Scheduled  [facility]-If you answer yes to questions #8, #20, #21Which Colonoscopy Prep was Sent?     CS Sedation Type     n PAC / Pre-op Required                                  "

## 2023-01-12 RX ORDER — BISACODYL 5 MG
TABLET, DELAYED RELEASE (ENTERIC COATED) ORAL
Qty: 4 TABLET | Refills: 0 | Status: SHIPPED | OUTPATIENT
Start: 2023-01-12 | End: 2024-01-16

## 2023-01-26 ENCOUNTER — HOSPITAL ENCOUNTER (OUTPATIENT)
Facility: CLINIC | Age: 53
Discharge: HOME | End: 2023-01-26
Attending: SURGERY | Admitting: SURGERY
Payer: COMMERCIAL

## 2023-01-26 VITALS
SYSTOLIC BLOOD PRESSURE: 125 MMHG | RESPIRATION RATE: 16 BRPM | WEIGHT: 152 LBS | DIASTOLIC BLOOD PRESSURE: 77 MMHG | HEART RATE: 60 BPM | HEIGHT: 62 IN | BODY MASS INDEX: 27.97 KG/M2 | TEMPERATURE: 98.1 F | OXYGEN SATURATION: 100 %

## 2023-01-26 DIAGNOSIS — Z12.11 COLON CANCER SCREENING: Primary | ICD-10-CM

## 2023-01-26 LAB — COLONOSCOPY: NORMAL

## 2023-01-26 PROCEDURE — G0500 MOD SEDAT ENDO SERVICE >5YRS: HCPCS | Performed by: SURGERY

## 2023-01-26 PROCEDURE — 45385 COLONOSCOPY W/LESION REMOVAL: CPT | Mod: PT | Performed by: SURGERY

## 2023-01-26 PROCEDURE — 88305 TISSUE EXAM BY PATHOLOGIST: CPT | Mod: TC | Performed by: SURGERY

## 2023-01-26 PROCEDURE — 250N000011 HC RX IP 250 OP 636: Performed by: SURGERY

## 2023-01-26 PROCEDURE — 250N000013 HC RX MED GY IP 250 OP 250 PS 637: Performed by: SURGERY

## 2023-01-26 PROCEDURE — 99152 MOD SED SAME PHYS/QHP 5/>YRS: CPT | Mod: 59 | Performed by: SURGERY

## 2023-01-26 PROCEDURE — 45382 COLONOSCOPY W/CONTROL BLEED: CPT | Performed by: SURGERY

## 2023-01-26 PROCEDURE — 45380 COLONOSCOPY AND BIOPSY: CPT | Performed by: SURGERY

## 2023-01-26 RX ORDER — ATROPINE SULFATE 0.1 MG/ML
1 INJECTION INTRAVENOUS
Status: DISCONTINUED | OUTPATIENT
Start: 2023-01-26 | End: 2023-01-26 | Stop reason: HOSPADM

## 2023-01-26 RX ORDER — NALOXONE HYDROCHLORIDE 0.4 MG/ML
0.4 INJECTION, SOLUTION INTRAMUSCULAR; INTRAVENOUS; SUBCUTANEOUS
Status: DISCONTINUED | OUTPATIENT
Start: 2023-01-26 | End: 2023-01-26 | Stop reason: HOSPADM

## 2023-01-26 RX ORDER — FENTANYL CITRATE 50 UG/ML
50-100 INJECTION, SOLUTION INTRAMUSCULAR; INTRAVENOUS EVERY 5 MIN PRN
Status: DISCONTINUED | OUTPATIENT
Start: 2023-01-26 | End: 2023-01-26 | Stop reason: HOSPADM

## 2023-01-26 RX ORDER — ONDANSETRON 2 MG/ML
4 INJECTION INTRAMUSCULAR; INTRAVENOUS
Status: DISCONTINUED | OUTPATIENT
Start: 2023-01-26 | End: 2023-01-26 | Stop reason: HOSPADM

## 2023-01-26 RX ORDER — DIPHENHYDRAMINE HYDROCHLORIDE 50 MG/ML
25-50 INJECTION INTRAMUSCULAR; INTRAVENOUS
Status: DISCONTINUED | OUTPATIENT
Start: 2023-01-26 | End: 2023-01-26 | Stop reason: HOSPADM

## 2023-01-26 RX ORDER — FLUMAZENIL 0.1 MG/ML
0.2 INJECTION, SOLUTION INTRAVENOUS
Status: DISCONTINUED | OUTPATIENT
Start: 2023-01-26 | End: 2023-01-26 | Stop reason: HOSPADM

## 2023-01-26 RX ORDER — ONDANSETRON 4 MG/1
4 TABLET, ORALLY DISINTEGRATING ORAL EVERY 6 HOURS PRN
Status: DISCONTINUED | OUTPATIENT
Start: 2023-01-26 | End: 2023-01-26 | Stop reason: HOSPADM

## 2023-01-26 RX ORDER — ONDANSETRON 2 MG/ML
4 INJECTION INTRAMUSCULAR; INTRAVENOUS EVERY 6 HOURS PRN
Status: DISCONTINUED | OUTPATIENT
Start: 2023-01-26 | End: 2023-01-26 | Stop reason: HOSPADM

## 2023-01-26 RX ORDER — NALOXONE HYDROCHLORIDE 0.4 MG/ML
0.2 INJECTION, SOLUTION INTRAMUSCULAR; INTRAVENOUS; SUBCUTANEOUS
Status: DISCONTINUED | OUTPATIENT
Start: 2023-01-26 | End: 2023-01-26 | Stop reason: HOSPADM

## 2023-01-26 RX ORDER — PROCHLORPERAZINE MALEATE 10 MG
10 TABLET ORAL EVERY 6 HOURS PRN
Status: DISCONTINUED | OUTPATIENT
Start: 2023-01-26 | End: 2023-01-26 | Stop reason: HOSPADM

## 2023-01-26 RX ORDER — LIDOCAINE 40 MG/G
CREAM TOPICAL
Status: DISCONTINUED | OUTPATIENT
Start: 2023-01-26 | End: 2023-01-26 | Stop reason: HOSPADM

## 2023-01-26 RX ORDER — SIMETHICONE 40MG/0.6ML
133 SUSPENSION, DROPS(FINAL DOSAGE FORM)(ML) ORAL
Status: COMPLETED | OUTPATIENT
Start: 2023-01-26 | End: 2023-01-26

## 2023-01-26 RX ORDER — EPINEPHRINE 1 MG/ML
0.1 INJECTION, SOLUTION INTRAMUSCULAR; SUBCUTANEOUS
Status: DISCONTINUED | OUTPATIENT
Start: 2023-01-26 | End: 2023-01-26 | Stop reason: HOSPADM

## 2023-01-26 RX ADMIN — FENTANYL CITRATE 100 MCG: 50 INJECTION, SOLUTION INTRAMUSCULAR; INTRAVENOUS at 11:22

## 2023-01-26 RX ADMIN — MIDAZOLAM HYDROCHLORIDE 2 MG: 1 INJECTION, SOLUTION INTRAMUSCULAR; INTRAVENOUS at 11:22

## 2023-01-26 RX ADMIN — SIMETHICONE 133 MG: 20 EMULSION ORAL at 11:32

## 2023-01-26 ASSESSMENT — ACTIVITIES OF DAILY LIVING (ADL): ADLS_ACUITY_SCORE: 33

## 2023-01-26 NOTE — H&P
[facility] Anesthesia Pre-op History and Physical    Fiona Tam MRN# [id number]   Age: 52 year old YOB: 1970      Date of Surgery: 01/26/23   [facility]      Date of Exam 1/26/2023 Facility (Same day)       Primary care provider: Pam Schneider         Chief Complaint and/or Reason for Procedure:   screening colonoscopy         Active problem list:     Patient Active Problem List    Diagnosis Date Noted     FH: breast cancer in first degree relative 02/20/2020     Priority: Medium     Non morbid obesity due to excess calories 10/26/2016     Priority: Medium     Irregular periods 10/26/2016     Priority: Medium     Cervical high risk HPV (human papillomavirus) test positive 10/26/2016     Priority: Medium     10/26/16: NIL pap + HR HPV (not 16 or 18). Plan cotest in 1 year.  12/1/17 NIL, Neg HPV. Plan 3 yr co-test  10/28/21 NIL, +HR HPV, not 16/18. Plan 1 yr co-test  12/8/22 NIL Pap, neg HPV. Plan cotest in 1 year.   12/15/2022 Pt viewed result on [facility].             Encounter for surveillance of contraceptive pills 10/07/2016     Priority: Medium     Subclinical hypothyroidism 01/09/2015     Priority: Medium     Thyroid nodule 12/15/2014     Priority: Medium     Fatigue 12/15/2014     Priority: Medium     Acne 12/14/2012     Priority: Medium            Medications (include herbals and vitamins):     Current Outpatient Medications   Medication Instructions     amLODIPine (NORVASC) 2.5 mg, Oral, DAILY     bisacodyl (DULCOLAX) 5 MG EC tablet Take 2 tablets at 3 pm the day before your procedure. If your procedure is before 11 am, take 2 additional tablets at 11 pm. If your procedure is after 11 am, take 2 additional tablets at 6 am. For additional instructions refer to your colonoscopy prep instructions.     levothyroxine (SYNTHROID/LEVOTHROID) 50 mcg, Oral, DAILY     metroNIDAZOLE (METROLOTION) 0.75 % external lotion APPLY TO FACE TWICE A DAY     polyethylene  glycol (GOLYTELY) 236 g suspension The night before the exam at 6 pm drink an 8-ounce glass every 15 minutes until the jug is half empty. If you arrive before 11 AM: Drink the other half of the Golytely jug at 11 PM night before procedure. If you arrive after 11 AM: Drink the other half of the Golytely jug at 6 AM day of procedure. For additional instructions refer to your colonoscopy prep instructions.                Allergies:      Allergies   Allergen Reactions     Sulfa Drugs                Physical Exam:   All vitals have been reviewed  No data found.  Airway assessment:   Mallampatti classification: Class II (visualization of the soft palate, fauces, and uvula)}     Lungs:   No increased work of breathing, good air exchange, clear to auscultation bilaterally     Cardiovascular:   regular rate and rhythm             Lab / Radiology Results:   N/a        Anesthetic risk and/or ASA classification:   asa2    Vandana Paul MD

## 2023-01-27 LAB
PATH REPORT.COMMENTS IMP SPEC: NORMAL
PATH REPORT.COMMENTS IMP SPEC: NORMAL
PATH REPORT.FINAL DX SPEC: NORMAL
PATH REPORT.GROSS SPEC: NORMAL
PATH REPORT.MICROSCOPIC SPEC OTHER STN: NORMAL
PATH REPORT.RELEVANT HX SPEC: NORMAL
PHOTO IMAGE: NORMAL

## 2023-01-27 PROCEDURE — 88305 TISSUE EXAM BY PATHOLOGIST: CPT | Mod: 26

## 2023-10-12 ENCOUNTER — OFFICE VISIT (OUTPATIENT)
Dept: PODIATRY | Facility: CLINIC | Age: 53
End: 2023-10-12
Payer: COMMERCIAL

## 2023-10-12 ENCOUNTER — ANCILLARY PROCEDURE (OUTPATIENT)
Dept: GENERAL RADIOLOGY | Facility: CLINIC | Age: 53
End: 2023-10-12
Attending: PODIATRIST
Payer: COMMERCIAL

## 2023-10-12 VITALS — WEIGHT: 152 LBS | BODY MASS INDEX: 27.8 KG/M2 | DIASTOLIC BLOOD PRESSURE: 74 MMHG | SYSTOLIC BLOOD PRESSURE: 118 MMHG

## 2023-10-12 DIAGNOSIS — Q66.70 PES CAVUS: ICD-10-CM

## 2023-10-12 DIAGNOSIS — M79.671 RIGHT FOOT PAIN: ICD-10-CM

## 2023-10-12 DIAGNOSIS — M79.671 RIGHT FOOT PAIN: Primary | ICD-10-CM

## 2023-10-12 DIAGNOSIS — M76.71 PERONEAL TENDONITIS, RIGHT: ICD-10-CM

## 2023-10-12 PROCEDURE — 99203 OFFICE O/P NEW LOW 30 MIN: CPT | Performed by: PODIATRIST

## 2023-10-12 PROCEDURE — 73630 X-RAY EXAM OF FOOT: CPT | Mod: TC | Performed by: STUDENT IN AN ORGANIZED HEALTH CARE EDUCATION/TRAINING PROGRAM

## 2023-10-12 RX ORDER — DICLOFENAC SODIUM 75 MG/1
75 TABLET, DELAYED RELEASE ORAL 2 TIMES DAILY
Qty: 28 TABLET | Refills: 0 | Status: SHIPPED | OUTPATIENT
Start: 2023-10-12 | End: 2024-01-16

## 2023-10-12 NOTE — PROGRESS NOTES
PATIENT HISTORY:   Fiona Tam is a 52 year old female who presents to clinic for pain to the right foot.  Has been going on for 3 months.  States has not been getting better.  Its a shooting pain.  Usually depends on how she stands or walks.  Can be 6 out of 10 at its worst.  Denies specific injury.  Wondering what is causing the pain and what can be done for it.      Review of Systems:  Patient denies fever, chills, rash, wound, stiffness, numbness, weakness, heart burn, blood in stool, chest pain with activity, calf pain when walking, shortness of breath with activity, chronic cough, easy bleeding/bruising, swelling of ankles, excessive thirst, fatigue, depression, anxiety.  Patient admits to limping at time.     PAST MEDICAL HISTORY:   Past Medical History:   Diagnosis Date    Cervical high risk HPV (human papillomavirus) test positive 10/26/16    (not 16 or 18)    Subclinical hypothyroidism     Thyroid nodule         PAST SURGICAL HISTORY:   Past Surgical History:   Procedure Laterality Date    COLONOSCOPY N/A 1/26/2023    Procedure: COLONOSCOPY, WITH POLYPECTOMY using cold snare and one assurance clip;  Surgeon: Vandana Paul MD;  Location:  GI    DENTAL SURGERY  1994    wisdom teeth    FOOT SURGERY Left 1995 Bunion        MEDICATIONS:   Current Outpatient Medications:     amLODIPine (NORVASC) 2.5 MG tablet, Take 1 tablet (2.5 mg) by mouth daily, Disp: 90 tablet, Rfl: 3    bisacodyl (DULCOLAX) 5 MG EC tablet, Take 2 tablets at 3 pm the day before your procedure. If your procedure is before 11 am, take 2 additional tablets at 11 pm. If your procedure is after 11 am, take 2 additional tablets at 6 am. For additional instructions refer to your colonoscopy prep instructions., Disp: 4 tablet, Rfl: 0    levothyroxine (SYNTHROID/LEVOTHROID) 50 MCG tablet, Take 1 tablet (50 mcg) by mouth daily, Disp: 90 tablet, Rfl: 3    metroNIDAZOLE (METROLOTION) 0.75 % external lotion, APPLY TO FACE TWICE A DAY, Disp: ,  Rfl:     polyethylene glycol (GOLYTELY) 236 g suspension, The night before the exam at 6 pm drink an 8-ounce glass every 15 minutes until the jug is half empty. If you arrive before 11 AM: Drink the other half of the Golytely jug at 11 PM night before procedure. If you arrive after 11 AM: Drink the other half of the Golytely jug at 6 AM day of procedure. For additional instructions refer to your colonoscopy prep instructions., Disp: 4000 mL, Rfl: 0     ALLERGIES:    Allergies   Allergen Reactions    Sulfa Antibiotics         SOCIAL HISTORY:   Social History     Socioeconomic History    Marital status:      Spouse name: Not on file    Number of children: Not on file    Years of education: Not on file    Highest education level: Not on file   Occupational History    Not on file   Tobacco Use    Smoking status: Never    Smokeless tobacco: Never   Substance and Sexual Activity    Alcohol use: Not Currently     Alcohol/week: 2.0 standard drinks of alcohol     Types: 2 Glasses of wine per week    Drug use: No    Sexual activity: Yes     Partners: Male     Birth control/protection: Pill   Other Topics Concern    Parent/sibling w/ CABG, MI or angioplasty before 65F 55M? Yes   Social History Narrative    Not on file     Social Determinants of Health     Financial Resource Strain: Low Risk  (12/7/2022)    Overall Financial Resource Strain (CARDIA)     Difficulty of Paying Living Expenses: Not very hard   Food Insecurity: No Food Insecurity (12/7/2022)    Hunger Vital Sign     Worried About Running Out of Food in the Last Year: Never true     Ran Out of Food in the Last Year: Never true   Transportation Needs: No Transportation Needs (12/7/2022)    PRAPARE - Transportation     Lack of Transportation (Medical): No     Lack of Transportation (Non-Medical): No   Physical Activity: Insufficiently Active (12/7/2022)    Exercise Vital Sign     Days of Exercise per Week: 4 days     Minutes of Exercise per Session: 30 min    Stress: No Stress Concern Present (12/7/2022)    [employer] - Occupational Stress Questionnaire     Feeling of Stress : Not at all   Social Connections: Socially Integrated (12/7/2022)    Social Connection and Isolation Panel [NHANES]     Frequency of Communication with Friends and Family: More than three times a week     Frequency of Social Gatherings with Friends and Family: Twice a week     Attends Worship Services: More than 4 times per year     Active Member of Clubs or Organizations: Yes     Attends Club or Organization Meetings: Not on file     Marital Status:    Interpersonal Safety: Not on file   Housing Stability: Unknown (12/7/2022)    Housing Stability Vital Sign     Unable to Pay for Housing in the Last Year: Patient refused     Number of Places Lived in the Last Year: Not on file     Unstable Housing in the Last Year: Patient refused        FAMILY HISTORY:   Family History   Problem Relation Age of Onset    Cancer Mother 58        lung and brain cancer    Breast Cancer Mother     Heart Disease Father     Hypertension Father     Lipids Father     Alzheimer Disease Father     Breast Cancer Maternal Grandmother     Colon Cancer No family hx of         EXAM:Vitals: /74   Wt 68.9 kg (152 lb)   BMI 27.80 kg/m      General appearance: Patient is alert and fully cooperative with history & exam.  No sign of distress is noted during the visit.     Psychiatric: Affect is pleasant & appropriate.  Patient appears motivated to improve health.     Respiratory: Breathing is regular & unlabored while sitting.     HEENT: Hearing is intact to spoken word.  Speech is clear.  No gross evidence of visual impairment that would impact ambulation.     Dermatologic: Skin is intact to both lower extremities without significant lesions, rash or abrasion.  No paronychia or evidence of soft tissue infection is noted.     Vascular: DP & PT pulses are intact & regular bilaterally.  No  significant edema or varicosities noted.  CFT and skin temperature is normal to both lower extremities.     Neurologic: Lower extremity sensation is intact to light touch.  No evidence of weakness or contracture in the lower extremities.  No evidence of neuropathy.     Musculoskeletal: Patient is ambulatory without assistive device or brace.  Increase arch height. Pain on palpation of the right 5th metatarsal base. Minimal pain with plantarflexion.     Radiographs:  right foot xray - I personally reviewed the xrays -increase calcaneal inclination angle.  No fractures are noted.  Otherwise unremarkable.     ASSESSMENT:    Right foot pain  Pes cavus  Peroneal tendonitis, right     Medical Decision Making/Plan:  [facility].  Reviewed and discussed x-rays. Reviewed and discussed causes of tendonitis.  We discussed treatments such as immobiliation, icing, stretching, heel lifts, orthotics, physical therapy, MRI.     At this time would recommend an ankle brace to help minimize motion of the tendon.  We will also do a stronger oral anti-inflammatory for short course.  We will have her wear the brace for the next month.  After that if her foot is continuing to feel good we will have her transition to shoes and inserts.  If pain continues we will have her follow-up and get an MRI of the ankle to assess for possible tendon tear.  She did note that she has a family history of rheumatoid and we discussed that if the MRI was negative then I would likely order lab work for further work-up of this.    Questions were answered to patient's satisfaction and she will call further questions or concerns.    Patient risk factor: Patient is at low risk for infection.        Margarita Holliday DPM, Podiatry/Foot and Ankle Surgery

## 2023-10-12 NOTE — PATIENT INSTRUCTIONS
Thank you for choosing [facility] Podiatry / Foot & Ankle Surgery!    DR AGUILERA'S CLINIC:  [facility]   [address]   [place] [address]      TRIAGE LINE: [phone]  APPOINTMENTS: [phone]  RADIOLOGY: [phone]  SET UP SURGERY: [phone]  PHYSICAL THERAPY: [phone]   FAX NUMBER: [phone]  BILLING QUESTIONS: [phone]       Follow up: 1 month        SUPER FEET-BERRY    TENDONITIS   Tendons are the strong fibrous portions of muscles that attach to bones and allow the muscle to move a joint when it contracts. Tendons are very strong because they have a lot of force exerted on them. Sometimes tendons can become painful because they have suffered an acute injury, in which too much force was exerted at one time, or an overuse injury, in which a normal force was exerted too frequently or over a prolonged period of time. As a result, there is damage to the tendon and its surrounding soft tissue structures and they become inflammed. Because tendons do not have a great blood supply, they do not heal rapidly and the inflammation can become chronic.   Conservative treatment for tendinitis involves rest and anti-inflammatory measures. Ice is applied 15 minutes 2-3 times daily. Anti-inflammatory medications called NSAIDs (ibuprofen, example) can be taken provided they are used with caution, as they can lead to internal bleeding and increase the risk ofstroke and heart attack. Sometimes topical nitroglycerin is prescribed to help with pain. Often your doctor will use a special shoe or removable walking cast to immobilize the tendon, allowing it to heal without further damage from use. These devices are very useful in helping tendons heal, but they may slow you down or make you feel like your hip, knee, or back are out ofalignment. This is temporary and should go away once you are out ofthe immobilization. You should not use a walking cast when showering or driving.  Another option is Platelet Rich Plasma injections. (Normally done with a Sports and Orthorapedic doctor.   If conservative measures fail, your physician may need to surgically repair the tendon by removing any chronic inflammatory tissue and sewing it back together. Sometimes it is sewn to an adjacent tendon with similar function for support and sometimes it is lengthened. . Sometimes the bones around the tendon need to be realigned or reshaped to better support the tendon or prevent further damage. Your foot and ankle surgeon will discuss the specifics of your surgery with you, should you need it.    Towel stretch: Sit on a hard surface with your injured leg stretched out in front of you. Loop a towel around your toes and the ball of your foot and pull the towel toward your body keeping your leg straight. Hold this position for 15 to 30 seconds and then relax. Repeat 3 times. Then push the towel away with the ball of your foot. Repeat 3 times.  When you don't feel much of a stretch using the towel, you can start the standing calf stretch and the following exercises.  Standing calf stretch: Stand facing a wall with your hands on the wall at about eye level. Keep your injured leg back with your heel on the floor. Keep the other leg forward with the knee bent. Turn your back foot slightly inward (as if you were pigeon-toed). Slowly lean into the wall until you feel a stretch in the back of your calf. Hold the stretch for 15 to 30 seconds. Return to the starting position. Repeat 3 times. Do this exercise several times each day.   Standing soleus stretch: Stand facing a wall with your hands on the wall at about chest height. Keep your injured leg back with your heel on the floor. Keep the other leg forward with the knee bent. Turn your back foot slightly inward (as if you were pigeon-toed). Bend your back knee slightly and gently lean into the wall until you feel a stretch in the lower calf of your injured leg. Hold the  stretch for 15 to 30 seconds. Return to the starting position. Repeat 3 times.   Achilles stretch: Stand with the ball of one foot on a stair. Reach for the step below with your heel until you feel a stretch in the arch of your foot. Hold this position for 15 to 30 seconds and then relax. Repeat 3 times.   Heel raise: Balance yourself while standing behind a chair or counter. Using the chair or counter as a support to help you, raise your body up onto your toes and hold for 5 seconds. Then slowly lower yourself down without holding onto the support. (It's OK to keep holding onto the support if you need to.) When this exercise becomes less painful, try lowering yourself down on the injured leg only. Repeat 15 times. Do 2 sets of 15. Rest 30 seconds between sets.   Step-up: Stand with the foot of your injured leg on a support 3 to 5 inches high (like a small step or block of wood). Keep your other foot flat on the floor. Shift your weight onto the injured leg on the support. Straighten your injured leg as the other leg comes off the floor. Return to the starting position by bending your injured leg and slowly lowering your uninjured leg back to the floor. Do 2 sets of 15.   Resisted ankle eversion: Sit with both legs stretched out in front of you, with your feet about a shoulder's width apart. Tie a loop in one end of elastic tubing. Put the foot of your injured leg through the loop so that the tubing goes around the arch of that foot and wraps around the outside of the other foot. Hold onto the other end of the tubing with your hand to provide tension. Turn the foot of your injured leg up and out. Make sure you keep your other foot still so that it will allow the tubing to stretch as you move the foot of your injured leg. Return to the starting position. Do 2 sets of 15.   Balance and reach exercises: Stand next to a chair with your injured leg farther from the chair. The chair will provide support if you need it.  Stand on the foot of your injured leg and bend your knee slightly. Try to raise the arch of this foot while keeping your big toe on the floor. Keep your foot in this position. With the hand that is farther away from the chair, reach forward in front of you by bending at the waist. Avoid bending your knee any more as you do this. Repeat this 10 times. To make the exercise more challenging, reach farther in front of you. Do 2 sets of 10.  the same position as above. While keeping your arch height, reach the hand that is farther away from the chair across your body toward the chair. The farther you reach, the more challenging the exercise. Do 2 sets of 10.   Resisted ankle eversion: Sit with both legs stretched out in front of you, with your feet about a shoulder's width apart. Tie a loop in one end of elastic tubing. Put the foot of your injured leg through the loop so that the tubing goes around the arch of that foot and wraps around the outside of the other foot. Hold onto the other end of the tubing with your hand to provide tension. Turn the foot of your injured leg up and out. Make sure you keep your other foot still so that it will allow the tubing to stretch as you move the foot of your injured leg. Return to the starting position. Do 2 sets of 15.   If you have access to a wobble board, do the following exercises:  Wobble board exercises:   Stand on a wobble board with your feet shoulder width apart. Rock the board forwards and backwards 30 times, then side to side 30 times. Hold on to a chair if you need support.   Rotate the wobble board around so that the edge of the board is in contact with the floor at all times. Do this 30 times in a clockwise and then a counterclockwise direction.   Balance on the wobble board for as long as you can without letting the edges touch the floor. Try to do this for 2 minutes without touching the floor.   Rotate the wobble board in clockwise and counterclockwise  circles, but do not let the edge of the board touch the floor.   When you have mastered exercises A through D, try repeating them while standing on just your injured leg.   After you are able to do these exercises on one leg, try to do them with your eyes closed. Make sure you have something nearby to support you in case you lose your balance.

## 2023-10-12 NOTE — LETTER
10/12/2023         RE: Fiona Tam  [address]  [facility] [address]        Dear Colleague,    Thank you for referring your patient, Fiona Tam, to the [facility] PODIATRY. Please see a copy of my visit note below.    PATIENT HISTORY:   Fiona Tam is a 52 year old female who presents to clinic for pain to the right foot.  Has been going on for 3 months.  States has not been getting better.  Its a shooting pain.  Usually depends on how she stands or walks.  Can be 6 out of 10 at its worst.  Denies specific injury.  Wondering what is causing the pain and what can be done for it.      Review of Systems:  Patient denies fever, chills, rash, wound, stiffness, numbness, weakness, heart burn, blood in stool, chest pain with activity, calf pain when walking, shortness of breath with activity, chronic cough, easy bleeding/bruising, swelling of ankles, excessive thirst, fatigue, depression, anxiety.  Patient admits to limping at time.     PAST MEDICAL HISTORY:   Past Medical History:   Diagnosis Date     Cervical high risk HPV (human papillomavirus) test positive 10/26/16    (not 16 or 18)     Subclinical hypothyroidism      Thyroid nodule         PAST SURGICAL HISTORY:   Past Surgical History:   Procedure Laterality Date     COLONOSCOPY N/A 1/26/2023    Procedure: COLONOSCOPY, WITH POLYPECTOMY using cold snare and one assurance clip;  Surgeon: Vandana Paul MD;  Location:  GI     DENTAL SURGERY  1994    wisdom teeth     FOOT SURGERY Left 1995 Bunion        MEDICATIONS:   Current Outpatient Medications:      amLODIPine (NORVASC) 2.5 MG tablet, Take 1 tablet (2.5 mg) by mouth daily, Disp: 90 tablet, Rfl: 3     bisacodyl (DULCOLAX) 5 MG EC tablet, Take 2 tablets at 3 pm the day before your procedure. If your procedure is before 11 am, take 2 additional tablets at 11 pm. If your procedure is after 11 am, take 2 additional tablets at 6 am. For additional instructions refer  to your colonoscopy prep instructions., Disp: 4 tablet, Rfl: 0     levothyroxine (SYNTHROID/LEVOTHROID) 50 MCG tablet, Take 1 tablet (50 mcg) by mouth daily, Disp: 90 tablet, Rfl: 3     metroNIDAZOLE (METROLOTION) 0.75 % external lotion, APPLY TO FACE TWICE A DAY, Disp: , Rfl:      polyethylene glycol (GOLYTELY) 236 g suspension, The night before the exam at 6 pm drink an 8-ounce glass every 15 minutes until the jug is half empty. If you arrive before 11 AM: Drink the other half of the Golytely jug at 11 PM night before procedure. If you arrive after 11 AM: Drink the other half of the Golytely jug at 6 AM day of procedure. For additional instructions refer to your colonoscopy prep instructions., Disp: 4000 mL, Rfl: 0     ALLERGIES:    Allergies   Allergen Reactions     Sulfa Antibiotics         SOCIAL HISTORY:   Social History     Socioeconomic History     Marital status:      Spouse name: Not on file     Number of children: Not on file     Years of education: Not on file     Highest education level: Not on file   Occupational History     Not on file   Tobacco Use     Smoking status: Never     Smokeless tobacco: Never   Substance and Sexual Activity     Alcohol use: Not Currently     Alcohol/week: 2.0 standard drinks of alcohol     Types: 2 Glasses of wine per week     Drug use: No     Sexual activity: Yes     Partners: Male     Birth control/protection: Pill   Other Topics Concern     Parent/sibling w/ CABG, MI or angioplasty before 65F 55M? Yes   Social History Narrative     Not on file     Social Determinants of Health     Financial Resource Strain: Low Risk  (12/7/2022)    Overall Financial Resource Strain (CARDIA)      Difficulty of Paying Living Expenses: Not very hard   Food Insecurity: No Food Insecurity (12/7/2022)    Hunger Vital Sign      Worried About Running Out of Food in the Last Year: Never true      Ran Out of Food in the Last Year: Never true   Transportation Needs: No Transportation Needs  (12/7/2022)    PRAPARE - Transportation      Lack of Transportation (Medical): No      Lack of Transportation (Non-Medical): No   Physical Activity: Insufficiently Active (12/7/2022)    Exercise Vital Sign      Days of Exercise per Week: 4 days      Minutes of Exercise per Session: 30 min   Stress: No Stress Concern Present (12/7/2022)    [employer] - Occupational Stress Questionnaire      Feeling of Stress : Not at all   Social Connections: Socially Integrated (12/7/2022)    Social Connection and Isolation Panel [NHANES]      Frequency of Communication with Friends and Family: More than three times a week      Frequency of Social Gatherings with Friends and Family: Twice a week      Attends Worship Services: More than 4 times per year      Active Member of Clubs or Organizations: Yes      Attends Club or Organization Meetings: Not on file      Marital Status:    Interpersonal Safety: Not on file   Housing Stability: Unknown (12/7/2022)    Housing Stability Vital Sign      Unable to Pay for Housing in the Last Year: Patient refused      Number of Places Lived in the Last Year: Not on file      Unstable Housing in the Last Year: Patient refused        FAMILY HISTORY:   Family History   Problem Relation Age of Onset     Cancer Mother 58        lung and brain cancer     Breast Cancer Mother      Heart Disease Father      Hypertension Father      Lipids Father      Alzheimer Disease Father      Breast Cancer Maternal Grandmother      Colon Cancer No family hx of         EXAM:Vitals: /74   Wt 68.9 kg (152 lb)   BMI 27.80 kg/m      General appearance: Patient is alert and fully cooperative with history & exam.  No sign of distress is noted during the visit.     Psychiatric: Affect is pleasant & appropriate.  Patient appears motivated to improve health.     Respiratory: Breathing is regular & unlabored while sitting.     HEENT: Hearing is intact to spoken word.  Speech is  clear.  No gross evidence of visual impairment that would impact ambulation.     Dermatologic: Skin is intact to both lower extremities without significant lesions, rash or abrasion.  No paronychia or evidence of soft tissue infection is noted.     Vascular: DP & PT pulses are intact & regular bilaterally.  No significant edema or varicosities noted.  CFT and skin temperature is normal to both lower extremities.     Neurologic: Lower extremity sensation is intact to light touch.  No evidence of weakness or contracture in the lower extremities.  No evidence of neuropathy.     Musculoskeletal: Patient is ambulatory without assistive device or brace.  Increase arch height. Pain on palpation of the right 5th metatarsal base. Minimal pain with plantarflexion.     Radiographs:  right foot xray - I personally reviewed the xrays -increase calcaneal inclination angle.  No fractures are noted.  Otherwise unremarkable.     ASSESSMENT:    Right foot pain  Pes cavus  Peroneal tendonitis, right     Medical Decision Making/Plan:  Reviewed patient's chart in [facility].  Reviewed and discussed x-rays. Reviewed and discussed causes of tendonitis.  We discussed treatments such as immobiliation, icing, stretching, heel lifts, orthotics, physical therapy, MRI.     At this time would recommend an ankle brace to help minimize motion of the tendon.  We will also do a stronger oral anti-inflammatory for short course.  We will have her wear the brace for the next month.  After that if her foot is continuing to feel good we will have her transition to shoes and inserts.  If pain continues we will have her follow-up and get an MRI of the ankle to assess for possible tendon tear.  She did note that she has a family history of rheumatoid and we discussed that if the MRI was negative then I would likely order lab work for further work-up of this.    Questions were answered to patient's satisfaction and she will call further questions or  concerns.    Patient risk factor: Patient is at low risk for infection.        Margarita Holliday DPM, Podiatry/Foot and Ankle Surgery      Again, thank you for allowing me to participate in the care of your patient.        Sincerely,        Margarita Holliday DPM, Podiatry/Foot and Ankle Surgery

## 2023-11-15 ENCOUNTER — OFFICE VISIT (OUTPATIENT)
Dept: PODIATRY | Facility: CLINIC | Age: 53
End: 2023-11-15
Payer: COMMERCIAL

## 2023-11-15 VITALS — SYSTOLIC BLOOD PRESSURE: 118 MMHG | BODY MASS INDEX: 27.8 KG/M2 | WEIGHT: 152 LBS | DIASTOLIC BLOOD PRESSURE: 72 MMHG

## 2023-11-15 DIAGNOSIS — M76.71 PERONEAL TENDONITIS, RIGHT: ICD-10-CM

## 2023-11-15 DIAGNOSIS — M79.671 RIGHT FOOT PAIN: Primary | ICD-10-CM

## 2023-11-15 PROCEDURE — 99213 OFFICE O/P EST LOW 20 MIN: CPT | Performed by: PODIATRIST

## 2023-11-15 NOTE — LETTER
11/15/2023         RE: Fiona Tam  [address]  [facility] [address]        Dear Colleague,    Thank you for referring your patient, Fiona Tam, to the [facility]. Please see a copy of my visit note below.    Podiatry / Foot and Ankle Surgery Progress Note    November 15, 2023    Subject: Patient was seen for follow-up on right foot tendinitis.  She notes that the brace may be helped a little bit but she still continues to have the pain.  It is intermittent.  Wondering what is continuing to cause this.    Objective:  Vitals: Wt 68.9 kg (152 lb)   BMI 27.80 kg/m    BMI= Body mass index is 27.8 kg/m .    General:  Patient is alert and orientated.  NAD.    Dermatologic: Skin is intact to both lower extremities without significant lesions, rash or abrasion.  No paronychia or evidence of soft tissue infection is noted.     Vascular: DP & PT pulses are intact & regular bilaterally.  No significant edema or varicosities noted.  CFT and skin temperature is normal to both lower extremities.     Neurologic: Lower extremity sensation is intact to light touch.  No evidence of weakness or contracture in the lower extremities.  No evidence of neuropathy.     Musculoskeletal: Patient is ambulatory without assistive device or brace.  Increase arch height. Pain on palpation of the right 5th metatarsal base. Minimal pain with plantarflexion.      Radiographs:  right foot xray - I personally reviewed the xrays -increase calcaneal inclination angle.  No fractures are noted.  Otherwise unremarkable.     ASSESSMENT:    Right foot pain  Pes cavus  Peroneal tendonitis, right     Medical Decision Making/Plan:  Reviewed patient's chart in epic.  At this time since the pain is continued I recommend an MRI of the ankle to assess for possible peroneal tendon tear or stress fracture that may have been missed on x-ray.  We will MyChart call her with the results.  If the MRI is completely  negative may do lab work for systemic arthritis.     All questions were answered to patient's satisfaction and she will call further questions or concerns.     Patient risk factor: Patient is at low risk for infection.     Margarita Holliday DPM, Podiatry/Foot and Ankle Surgery      Again, thank you for allowing me to participate in the care of your patient.        Sincerely,        Margarita Holliday DPM, Podiatry/Foot and Ankle Surgery

## 2023-11-15 NOTE — PATIENT INSTRUCTIONS
Thank you for choosing [facility] Podiatry / Foot & Ankle Surgery!    DR AGUILERA'S CLINIC:  [facility]   [address]   [place] [address]      TRIAGE LINE: [phone]  APPOINTMENTS: [phone]  RADIOLOGY: [phone]  SET UP SURGERY: [phone]  PHYSICAL THERAPY: [phone]   FAX NUMBER: [phone]  BILLING QUESTIONS: [phone]       Follow up: Will call with MRI results       Please call to schedule your MRI/CT/Ultrasound/Arthrogram appointment.  The number is [phone].

## 2023-11-15 NOTE — PROGRESS NOTES
Podiatry / Foot and Ankle Surgery Progress Note    November 15, 2023    Subject: Patient was seen for follow-up on right foot tendinitis.  She notes that the brace may be helped a little bit but she still continues to have the pain.  It is intermittent.  Wondering what is continuing to cause this.    Objective:  Vitals: Wt 68.9 kg (152 lb)   BMI 27.80 kg/m    BMI= Body mass index is 27.8 kg/m .    General:  Patient is alert and orientated.  NAD.    Dermatologic: Skin is intact to both lower extremities without significant lesions, rash or abrasion.  No paronychia or evidence of soft tissue infection is noted.     Vascular: DP & PT pulses are intact & regular bilaterally.  No significant edema or varicosities noted.  CFT and skin temperature is normal to both lower extremities.     Neurologic: Lower extremity sensation is intact to light touch.  No evidence of weakness or contracture in the lower extremities.  No evidence of neuropathy.     Musculoskeletal: Patient is ambulatory without assistive device or brace.  Increase arch height. Pain on palpation of the right 5th metatarsal base. Minimal pain with plantarflexion.      Radiographs:  right foot xray - I personally reviewed the xrays -increase calcaneal inclination angle.  No fractures are noted.  Otherwise unremarkable.     ASSESSMENT:    Right foot pain  Pes cavus  Peroneal tendonitis, right     Medical Decision Making/Plan:  Reviewed patient's chart in epic.  At this time since the pain is continued I recommend an MRI of the ankle to assess for possible peroneal tendon tear or stress fracture that may have been missed on x-ray.  We will MyChart call her with the results.  If the MRI is completely negative may do lab work for systemic arthritis.     All questions were answered to patient's satisfaction and she will call further questions or concerns.     Patient risk factor: Patient is at low risk for infection.     Margarita Holliday DPM, Podiatry/Foot and Ankle  Surgery

## 2023-11-27 ENCOUNTER — PATIENT OUTREACH (OUTPATIENT)
Dept: FAMILY MEDICINE | Facility: CLINIC | Age: 53
End: 2023-11-27
Payer: COMMERCIAL

## 2023-11-27 DIAGNOSIS — R87.810 CERVICAL HIGH RISK HPV (HUMAN PAPILLOMAVIRUS) TEST POSITIVE: Primary | ICD-10-CM

## 2023-12-09 ENCOUNTER — HOSPITAL ENCOUNTER (OUTPATIENT)
Dept: MRI IMAGING | Facility: CLINIC | Age: 53
Discharge: HOME | End: 2023-12-09
Attending: PODIATRIST | Admitting: PODIATRIST
Payer: COMMERCIAL

## 2023-12-09 DIAGNOSIS — M79.671 RIGHT FOOT PAIN: ICD-10-CM

## 2023-12-09 DIAGNOSIS — M76.71 PERONEAL TENDONITIS, RIGHT: ICD-10-CM

## 2023-12-09 PROCEDURE — 73721 MRI JNT OF LWR EXTRE W/O DYE: CPT | Mod: RT

## 2023-12-11 ENCOUNTER — ANCILLARY ORDERS (OUTPATIENT)
Dept: MAMMOGRAPHY | Facility: CLINIC | Age: 53
End: 2023-12-11

## 2023-12-11 ENCOUNTER — MYC MEDICAL ADVICE (OUTPATIENT)
Dept: PODIATRY | Facility: CLINIC | Age: 53
End: 2023-12-11
Payer: COMMERCIAL

## 2023-12-11 DIAGNOSIS — Z12.31 VISIT FOR SCREENING MAMMOGRAM: Primary | ICD-10-CM

## 2023-12-11 NOTE — TELEPHONE ENCOUNTER
Artemio Jaimes,    I just received your MRI results on your right ankle.  It does show some tearing of the peroneal tendon.  I recommend follow-up in clinic to discuss options such as physical therapy or surgery.    Margarita Holliday DPM

## 2023-12-19 ENCOUNTER — OFFICE VISIT (OUTPATIENT)
Dept: PODIATRY | Facility: CLINIC | Age: 53
End: 2023-12-19
Payer: COMMERCIAL

## 2023-12-19 VITALS — SYSTOLIC BLOOD PRESSURE: 110 MMHG | BODY MASS INDEX: 27.8 KG/M2 | DIASTOLIC BLOOD PRESSURE: 70 MMHG | WEIGHT: 152 LBS

## 2023-12-19 DIAGNOSIS — M25.571 CHRONIC PAIN OF RIGHT ANKLE: Primary | ICD-10-CM

## 2023-12-19 DIAGNOSIS — M76.71 PERONEAL TENDONITIS, RIGHT: ICD-10-CM

## 2023-12-19 DIAGNOSIS — M54.31 SCIATICA, RIGHT SIDE: ICD-10-CM

## 2023-12-19 DIAGNOSIS — G89.29 CHRONIC PAIN OF RIGHT ANKLE: Primary | ICD-10-CM

## 2023-12-19 DIAGNOSIS — Q66.70 PES CAVUS: ICD-10-CM

## 2023-12-19 PROCEDURE — 99213 OFFICE O/P EST LOW 20 MIN: CPT | Performed by: PODIATRIST

## 2023-12-19 NOTE — PROGRESS NOTES
Podiatry / Foot and Ankle Surgery Progress Note    December 19, 2023    Subject: Patient was seen for follow-up on MRI results of the right ankle.  Notes that the pain is still intermittent.  It is in the base of the fifth metatarsal.  Comes and goes.    Objective:  Vitals: Wt 68.9 kg (152 lb)   BMI 27.80 kg/m    BMI= Body mass index is 27.8 kg/m .    General: NAD   Dermatologic: Skin is intact to both lower extremities without significant lesions, rash or abrasion.  No paronychia or evidence of soft tissue infection is noted.     Vascular: DP & PT pulses are intact & regular bilaterally.  No significant edema or varicosities noted.  CFT and skin temperature is normal to both lower extremities.     Neurologic: Lower extremity sensation is intact to light touch.  No evidence of weakness or contracture in the lower extremities.  No evidence of neuropathy.     Musculoskeletal: Patient is ambulatory without assistive device or brace.  Increase arch height. Pain on palpation of the right 5th metatarsal base. Minimal pain with plantarflexion.      Radiographs:  right foot xray - I personally reviewed the xrays -increase calcaneal inclination angle.  No fractures are noted.  Otherwise unremarkable.    MRI right ankle -  Longitudinal split peroneus brevis tendon tear in the inframalleolar lateral hindfoot.  2.  Mild posterior tibialis tendinopathy.  3.  No acute right ankle ligament injury.  4.  No acute ankle or hindfoot fracture. No stress fracture.  5.  Lobulated and septated ganglion cyst in the anterolateral ankle deep to the extensor digitorum longus tendon.     ASSESSMENT:     Chronic pain of right ankle  Sciatica, right side  Peroneal tendonitis, right  Pes cavus     Medical Decision Making/Plan:  Review[facility] in [facility].  Reviewed and discussed MRI results.  The pain is to the base of her fifth metatarsal and not along the peroneal tendon.  Concerned if this is more coming from her back.  We will start  some physical therapy to start overall body stretching and strengthening.  If pain continues may refer to Ortho or sports med for lower back assessment as she does have pain there.  All questions were answered to patient's satisfaction and she will call further questions or concerns.     Patient risk factor: Patient is at low risk for infection.     Margarita Holliday DPM, Podiatry/Foot and Ankle Surgery

## 2023-12-19 NOTE — LETTER
12/19/2023         RE: Fiona Tam  [address]  [facility] [address]        Dear Colleague,    Thank you for referring your patient, Fiona Tam, to the [facility]. Please see a copy of my visit note below.    Podiatry / Foot and Ankle Surgery Progress Note    December 19, 2023    Subject: Patient was seen for follow-up on MRI results of the right ankle.  Notes that the pain is still intermittent.  It is in the base of the fifth metatarsal.  Comes and goes.    Objective:  Vitals: Wt 68.9 kg (152 lb)   BMI 27.80 kg/m    BMI= Body mass index is 27.8 kg/m .    General: NAD   Dermatologic: Skin is intact to both lower extremities without significant lesions, rash or abrasion.  No paronychia or evidence of soft tissue infection is noted.     Vascular: DP & PT pulses are intact & regular bilaterally.  No significant edema or varicosities noted.  CFT and skin temperature is normal to both lower extremities.     Neurologic: Lower extremity sensation is intact to light touch.  No evidence of weakness or contracture in the lower extremities.  No evidence of neuropathy.     Musculoskeletal: Patient is ambulatory without assistive device or brace.  Increase arch height. Pain on palpation of the right 5th metatarsal base. Minimal pain with plantarflexion.      Radiographs:  right foot xray - I personally reviewed the xrays -increase calcaneal inclination angle.  No fractures are noted.  Otherwise unremarkable.    MRI right ankle -  Longitudinal split peroneus brevis tendon tear in the inframalleolar lateral hindfoot.  2.  Mild posterior tibialis tendinopathy.  3.  No acute right ankle ligament injury.  4.  No acute ankle or hindfoot fracture. No stress fracture.  5.  Lobulated and septated ganglion cyst in the anterolateral ankle deep to the extensor digitorum longus tendon.     ASSESSMENT:     Chronic pain of right ankle  Sciatica, right side  Peroneal tendonitis, right  Pes  rodríguez     Medical Decision Making/Plan:  Reviewed patient's chart in [facility].  Reviewed and discussed MRI results.  The pain is to the base of her fifth metatarsal and not along the peroneal tendon.  Concerned if this is more coming from her back.  We will start some physical therapy to start overall body stretching and strengthening.  If pain continues may refer to Ortho or sports med for lower back assessment as she does have pain there.  All questions were answered to patient's satisfaction and she will call further questions or concerns.     Patient risk factor: Patient is at low risk for infection.     Margarita Holliday DPM, Podiatry/Foot and Ankle Surgery      Again, thank you for allowing me to participate in the care of your patient.        Sincerely,        Margarita Holliday DPM, Podiatry/Foot and Ankle Surgery

## 2024-01-04 ENCOUNTER — ANCILLARY PROCEDURE (OUTPATIENT)
Dept: MAMMOGRAPHY | Facility: CLINIC | Age: 54
End: 2024-01-04
Attending: PHYSICIAN ASSISTANT
Payer: COMMERCIAL

## 2024-01-04 DIAGNOSIS — Z12.31 VISIT FOR SCREENING MAMMOGRAM: ICD-10-CM

## 2024-01-04 PROCEDURE — 77067 SCR MAMMO BI INCL CAD: CPT | Mod: TC | Performed by: RADIOLOGY

## 2024-01-04 PROCEDURE — 77063 BREAST TOMOSYNTHESIS BI: CPT | Mod: TC | Performed by: RADIOLOGY

## 2024-01-04 NOTE — PROGRESS NOTES
PHYSICAL THERAPY EVALUATION  Type of Visit: Evaluation    See electronic medical record for Abuse and Falls Screening details.    Subjective Pt. complains of right lateral ankle pain that has been present since August of 2023.  No known trauma.  She recalls sx's began while sitting with legs crossed and having intermittent episodes of pain since.  Symptoms are exacerbated by unknown.  Sx's will randomly be evident with sitting and with walking occasionally.  Symptoms are relieved by  unknown.   Current function restrictions:  making sharp turns while walking.  Previous functional status as reported by patient: unlimited.  Notes from visit with Dr. Holliday:   The pain is to the base of her fifth metatarsal and not along the peroneal tendon.  Concerned if this is more coming from her back.  We will start some physical therapy to start overall body stretching and strengthening.  If pain continues may refer to Ortho or sports med for lower back assessment as she does have pain there.         Presenting condition or subjective complaint: R ankle pain  Date of onset: 08/01/23    Relevant medical history: Arthritis; Overweight; Thyroid problems   Dates & types of surgery:      Prior diagnostic imaging/testing results: MRI; X-ray     Prior therapy history for the same diagnosis, illness or injury: No      Prior Level of Function  Transfers: Independent  Ambulation: Independent  ADL: Independent  IADL:     Living Environment  Social support: With a significant other or spouse   Type of home: House; Multi-level   Stairs to enter the home:         Ramp:     Stairs inside the home:         Help at home:    Equipment owned:       Employment: Yes sales  Hobbies/Interests: [employer] painting, reading and gardening    Patient goals for therapy: Nothing today, walking longer periods  of thime are painful    Pain assessment: 0/10 today, 3/10 at worst     Objective   Negative LB screening today  R and L ankle AROM WNL and  symetrical  Palpable tenderness noted at the base of the fifth metatarsal on the right side.  Slight hypertrophy of the base of the fifth metatarsal compared to the left  Left ankle strength 5 out of 5 generally  Right ankle strength 4+ out of 5 for eversion, all other movements 5 out of 5    Assessment & Plan   CLINICAL IMPRESSIONS  Medical Diagnosis: R ankle pain    Treatment Diagnosis: R ankle pain   Impression/Assessment: Patient is a 53 year old female with right ankle complaints.  The following significant findings have been identified: Pain, Decreased ROM/flexibility, Decreased strength, Decreased proprioception, Impaired muscle performance, and Decreased activity tolerance. These impairments interfere with their ability to perform self care tasks, recreational activities, and household chores as compared to previous level of function.     Clinical Decision Making (Complexity):  Clinical Presentation: Stable/Uncomplicated  Clinical Presentation Rationale: based on medical and personal factors listed in PT evaluation  Clinical Decision Making (Complexity): Low complexity    PLAN OF CARE  Treatment Interventions:  Interventions: Neuromuscular Re-education, Therapeutic Activity, Therapeutic Exercise    Long Term Goals     PT Goal 1  Goal Identifier: STG  Goal Description: Pt will be able to tolerate walking for 30 minutes  Rationale: to maximize safety and independence with performance of ADLs and functional tasks;to maximize safety and independence within the home;to maximize safety and independence within the community  Target Date: 02/16/24      Frequency of Treatment: 1 x week  Duration of Treatment: 6 weeks    Recommended Referrals to Other Professionals:   Education Assessment:   Learner/Method: Patient;Listening;Demonstration;Pictures/Video  Education Comments: Pt educated on plan of care    Risks and benefits of evaluation/treatment have been explained.   Patient/Family/caregiver agrees with Plan of  Care.     Evaluation Time:     PT Eval, Low Complexity Minutes (57972): 25       Signing Clinician: Andi Phelan PT

## 2024-01-05 ENCOUNTER — THERAPY VISIT (OUTPATIENT)
Dept: PHYSICAL THERAPY | Facility: CLINIC | Age: 54
End: 2024-01-05
Attending: PODIATRIST
Payer: COMMERCIAL

## 2024-01-05 DIAGNOSIS — M54.31 SCIATICA, RIGHT SIDE: ICD-10-CM

## 2024-01-05 DIAGNOSIS — G89.29 CHRONIC PAIN OF RIGHT ANKLE: ICD-10-CM

## 2024-01-05 DIAGNOSIS — M76.71 PERONEAL TENDONITIS, RIGHT: ICD-10-CM

## 2024-01-05 DIAGNOSIS — M25.571 CHRONIC PAIN OF RIGHT ANKLE: ICD-10-CM

## 2024-01-05 PROCEDURE — 97161 PT EVAL LOW COMPLEX 20 MIN: CPT | Mod: GP | Performed by: PHYSICAL THERAPIST

## 2024-01-05 PROCEDURE — 97110 THERAPEUTIC EXERCISES: CPT | Mod: GP | Performed by: PHYSICAL THERAPIST

## 2024-01-16 ENCOUNTER — OFFICE VISIT (OUTPATIENT)
Dept: FAMILY MEDICINE | Facility: CLINIC | Age: 54
End: 2024-01-16
Payer: COMMERCIAL

## 2024-01-16 VITALS
SYSTOLIC BLOOD PRESSURE: 117 MMHG | RESPIRATION RATE: 16 BRPM | BODY MASS INDEX: 29.33 KG/M2 | HEIGHT: 62 IN | DIASTOLIC BLOOD PRESSURE: 70 MMHG | WEIGHT: 159.4 LBS | OXYGEN SATURATION: 100 % | HEART RATE: 70 BPM | TEMPERATURE: 97.9 F

## 2024-01-16 DIAGNOSIS — Z23 NEED FOR PROPHYLACTIC VACCINATION AND INOCULATION AGAINST INFLUENZA: ICD-10-CM

## 2024-01-16 DIAGNOSIS — R21 RASH AND NONSPECIFIC SKIN ERUPTION: ICD-10-CM

## 2024-01-16 DIAGNOSIS — Z00.00 ROUTINE GENERAL MEDICAL EXAMINATION AT A HEALTH CARE FACILITY: Primary | ICD-10-CM

## 2024-01-16 DIAGNOSIS — E03.8 SUBCLINICAL HYPOTHYROIDISM: ICD-10-CM

## 2024-01-16 DIAGNOSIS — Z12.4 CERVICAL CANCER SCREENING: ICD-10-CM

## 2024-01-16 LAB
ANION GAP SERPL CALCULATED.3IONS-SCNC: 10 MMOL/L (ref 7–15)
BUN SERPL-MCNC: 10.6 MG/DL (ref 6–20)
CALCIUM SERPL-MCNC: 9.6 MG/DL (ref 8.6–10)
CHLORIDE SERPL-SCNC: 100 MMOL/L (ref 98–107)
CHOLEST SERPL-MCNC: 194 MG/DL
CREAT SERPL-MCNC: 0.79 MG/DL (ref 0.51–0.95)
DEPRECATED HCO3 PLAS-SCNC: 27 MMOL/L (ref 22–29)
EGFRCR SERPLBLD CKD-EPI 2021: 90 ML/MIN/1.73M2
FASTING STATUS PATIENT QL REPORTED: YES
GLUCOSE SERPL-MCNC: 82 MG/DL (ref 70–99)
HDLC SERPL-MCNC: 73 MG/DL
LDLC SERPL CALC-MCNC: 110 MG/DL
NONHDLC SERPL-MCNC: 121 MG/DL
POTASSIUM SERPL-SCNC: 5 MMOL/L (ref 3.4–5.3)
SODIUM SERPL-SCNC: 137 MMOL/L (ref 135–145)
T4 FREE SERPL-MCNC: 1.21 NG/DL (ref 0.9–1.7)
TRIGL SERPL-MCNC: 53 MG/DL
TSH SERPL DL<=0.005 MIU/L-ACNC: 4.72 UIU/ML (ref 0.3–4.2)

## 2024-01-16 PROCEDURE — 84439 ASSAY OF FREE THYROXINE: CPT | Performed by: PHYSICIAN ASSISTANT

## 2024-01-16 PROCEDURE — 84443 ASSAY THYROID STIM HORMONE: CPT | Performed by: PHYSICIAN ASSISTANT

## 2024-01-16 PROCEDURE — G0145 SCR C/V CYTO,THINLAYER,RESCR: HCPCS | Performed by: PHYSICIAN ASSISTANT

## 2024-01-16 PROCEDURE — 36415 COLL VENOUS BLD VENIPUNCTURE: CPT | Performed by: PHYSICIAN ASSISTANT

## 2024-01-16 PROCEDURE — 87624 HPV HI-RISK TYP POOLED RSLT: CPT | Performed by: PHYSICIAN ASSISTANT

## 2024-01-16 PROCEDURE — 90686 IIV4 VACC NO PRSV 0.5 ML IM: CPT | Performed by: PHYSICIAN ASSISTANT

## 2024-01-16 PROCEDURE — 90480 ADMN SARSCOV2 VAC 1/ONLY CMP: CPT | Performed by: PHYSICIAN ASSISTANT

## 2024-01-16 PROCEDURE — 99213 OFFICE O/P EST LOW 20 MIN: CPT | Mod: 25 | Performed by: PHYSICIAN ASSISTANT

## 2024-01-16 PROCEDURE — 99396 PREV VISIT EST AGE 40-64: CPT | Mod: 25 | Performed by: PHYSICIAN ASSISTANT

## 2024-01-16 PROCEDURE — 90471 IMMUNIZATION ADMIN: CPT | Performed by: PHYSICIAN ASSISTANT

## 2024-01-16 PROCEDURE — 80061 LIPID PANEL: CPT | Performed by: PHYSICIAN ASSISTANT

## 2024-01-16 PROCEDURE — 80048 BASIC METABOLIC PNL TOTAL CA: CPT | Performed by: PHYSICIAN ASSISTANT

## 2024-01-16 PROCEDURE — 91320 SARSCV2 VAC 30MCG TRS-SUC IM: CPT | Performed by: PHYSICIAN ASSISTANT

## 2024-01-16 RX ORDER — TRIAMCINOLONE ACETONIDE 1 MG/G
OINTMENT TOPICAL 2 TIMES DAILY
Qty: 30 G | Refills: 1 | Status: SHIPPED | OUTPATIENT
Start: 2024-01-16

## 2024-01-16 ASSESSMENT — ENCOUNTER SYMPTOMS
JOINT SWELLING: 0
FREQUENCY: 0
EYE PAIN: 0
SHORTNESS OF BREATH: 0
NERVOUS/ANXIOUS: 0
PARESTHESIAS: 0
WEAKNESS: 0
CHILLS: 0
HEADACHES: 0
MYALGIAS: 0
ARTHRALGIAS: 0
ABDOMINAL PAIN: 0
FEVER: 0
DYSURIA: 0
HEARTBURN: 0
COUGH: 0
SORE THROAT: 0
CONSTIPATION: 0
BREAST MASS: 0
HEMATURIA: 0
DIZZINESS: 0
DIARRHEA: 0
HEMATOCHEZIA: 0
PALPITATIONS: 0
NAUSEA: 0

## 2024-01-16 ASSESSMENT — PAIN SCALES - GENERAL: PAINLEVEL: NO PAIN (0)

## 2024-01-16 NOTE — PROGRESS NOTES
SUBJECTIVE:   Fiona is a 53 year old, presenting for the following:  Physical (With pap /Pt is fasting)    Fiona doing well today.  Up to date on both mammogram and colonoscopy.  Pap today.    Has history of Raynaud's and has been trialed on amlodipine without improvement.  Condition is bothersome, but has not limited her activity level or become increasingly painful.  She uses hand warmers and avoids triggers which has been a successful management strategy for her.  She would like to discontinue the amlodipine.      Feels her activity level has decreased recently with her daughter and infant granddaughter having recently moved back in.  She is trying to find more ways to stay active during the cold months.    Will be traveling to [place] in the coming months, and would like a new prescription of topical triamcinolone for insect bite relief.  She has used this in the past with great success.          1/16/2024     6:52 AM   Additional Questions   Roomed by amy PINA Ma   Accompanied by self         1/16/2024     6:52 AM   Patient Reported Additional Medications   Patient reports taking the following new medications none       Healthy Habits:     Getting at least 3 servings of Calcium per day:  Yes    Bi-annual eye exam:  Yes    Dental care twice a year:  Yes    Sleep apnea or symptoms of sleep apnea:  None    Diet:  Gluten-free/reduced    Frequency of exercise:  2-3 days/week    Duration of exercise:  Less than 15 minutes    Taking medications regularly:  Yes    Medication side effects:  None    Additional concerns today:  No      Today's PHQ-2 Score:       1/16/2024     6:42 AM   PHQ-2 ( 1999 [employer])   Q1: Little interest or pleasure in doing things 0   Q2: Feeling down, depressed or hopeless 0   PHQ-2 Score 0   Q1: Little interest or pleasure in doing things Not at all   Q2: Feeling down, depressed or hopeless Not at all   PHQ-2 Score 0         Social History     Tobacco Use    Smoking status: Never    Smokeless  tobacco: Never   Substance Use Topics    Alcohol use: Not Currently     Alcohol/week: 2.0 standard drinks of alcohol     Types: 2 Glasses of wine per week             1/16/2024     6:41 AM   Alcohol Use   Prescreen: >3 drinks/day or >7 drinks/week? Not Applicable     Reviewed orders with patient.  Reviewed health maintenance and updated orders accordingly - Yes      Breast Cancer Screening:    FHS-7:       10/28/2021     7:00 AM 12/9/2021     3:03 PM 12/7/2022    12:11 PM 12/19/2022     8:02 AM 1/4/2024     8:08 AM 1/16/2024     6:43 AM   Breast CA Risk Assessment (FHS-7)   Did any of your first-degree relatives have breast or ovarian cancer? Yes Yes Yes No Yes Yes   Did any of your relatives have bilateral breast cancer? No No Unknown No No Yes   Did any man in your family have breast cancer? No No No No No No   Did any woman in your family have breast and ovarian cancer? Yes No Yes No No Yes   Did any woman in your family have breast cancer before age 50 y? No No Yes Yes Yes Yes   Do you have 2 or more relatives with breast and/or ovarian cancer? Yes No Yes Yes Yes Yes   Do you have 2 or more relatives with breast and/or bowel cancer? Yes No Yes No No Yes       Mammogram Screening: Recommended annual mammography  Pertinent mammograms are reviewed under the imaging tab.    History of abnormal Pap smear: YES - updated in Problem List and Health Maintenance accordingly      Latest Ref Rng & Units 12/8/2022     9:22 AM 10/28/2021     7:14 AM 12/1/2017     3:14 PM   PAP / HPV   PAP  Negative for Intraepithelial Lesion or Malignancy (NILM)  Negative for Intraepithelial Lesion or Malignancy (NILM)     HPV 16 DNA Negative Negative  Negative  Negative    HPV 18 DNA Negative Negative  Negative  Negative    Other HR HPV Negative Negative  Positive  Negative      10/26/16: NIL pap + HR HPV (not 16 or 18). Plan cotest in 1 year.  12/1/17 NIL, Neg HPV. Plan 3 yr co-test  10/28/21 NIL, +HR HPV, not 16/18. Plan 1 yr  "co-test  12/8/22 NIL Pap, neg HPV. Plan cotest in 1 year.    Reviewed and updated as needed this visit by clinical staff   Tobacco  Allergies  Meds              Reviewed and updated as needed this visit by Provider                     Review of Systems   Constitutional:  Negative for chills and fever.   HENT:  Negative for congestion, ear pain, hearing loss and sore throat.    Eyes:  Negative for pain and visual disturbance.   Respiratory:  Negative for cough and shortness of breath.    Cardiovascular:  Negative for chest pain, palpitations and peripheral edema.   Gastrointestinal:  Negative for abdominal pain, constipation, diarrhea, heartburn, hematochezia and nausea.   Breasts:  Negative for tenderness, breast mass and discharge.   Genitourinary:  Negative for dysuria, frequency, genital sores, hematuria, pelvic pain, urgency, vaginal bleeding and vaginal discharge.   Musculoskeletal:  Negative for arthralgias, joint swelling and myalgias.   Skin:  Negative for rash.   Neurological:  Negative for dizziness, weakness, headaches and paresthesias.   Psychiatric/Behavioral:  Negative for mood changes. The patient is not nervous/anxious.           OBJECTIVE:   /70 (BP Location: Right arm, Patient Position: Sitting, Cuff Size: Adult Regular)   Pulse 70   Temp 97.9  F (36.6  C) (Oral)   Resp 16   Ht 1.575 m (5' 2\")   Wt 72.3 kg (159 lb 6.4 oz)   SpO2 100%   BMI 29.15 kg/m    Physical Exam  GENERAL: healthy, alert and no distress  EYES: Eyes grossly normal to inspection, PERRL and conjunctivae and sclerae normal  HENT: ear canals and TM's normal, nose and mouth without ulcers or lesions  NECK: no adenopathy, no asymmetry, masses, or scars and thyroid normal to palpation  RESP: lungs clear to auscultation - no rales, rhonchi or wheezes  CV: regular rate and rhythm, normal S1 S2, no S3 or S4, no murmur, click or rub, no peripheral edema and peripheral pulses strong  ABDOMEN: soft, nontender, no " "hepatosplenomegaly, no masses and bowel sounds normal   (female): normal female external genitalia, normal urethral meatus, vaginal mucosa pink, moist, well rugated, and normal cervix/adnexa/uterus without masses or discharge  PSYCH: mentation appears normal, affect normal/bright    Diagnostic Test Results:  Labs reviewed in Epic    ASSESSMENT/PLAN:   (Z00.00) Routine general medical examination at a health care facility  (primary encounter diagnosis)  Comment: Fasting today, will check lipids as her last result was non-fasting  Plan: Lipid panel reflex to direct LDL Fasting, Basic        metabolic panel  (Ca, Cl, CO2, Creat, Gluc, K,         Na, BUN)    (E03.8) Subclinical hypothyroidism  Comment: Will check thyroid panel to make sure levothyroxine dose is appropriate. Will send in refill pending results  Plan: TSH WITH FREE T4 REFLEX            (Z12.4) Cervical cancer screening  Comment: Due to Pap today, normal Pap and negative HPV last year  Plan: Pap Screen with HPV - recommended age 30 - 65         years          (Z23) Need for prophylactic vaccination and inoculation against influenza  Comment:   Plan: given today    (R21) Rash and nonspecific skin eruption  Comment: Will be traveling to [place] for vacation and would like kenalog for insect bite relief, PRN; has used in the past with succes  Plan: triamcinolone (KENALOG) 0.1 % external ointment                  COUNSELING:  Reviewed preventive health counseling, as reflected in patient instructions       Regular exercise       Healthy diet/nutrition       Immunizations  Vaccinated for: Covid-19 and Influenza         Colorectal Cancer Screening       (Litzy)menopause management      BMI:   Estimated body mass index is 29.15 kg/m  as calculated from the following:    Height as of this encounter: 1.575 m (5' 2\").    Weight as of this encounter: 72.3 kg (159 lb 6.4 oz).         She reports that she has never smoked. She has never used smokeless " tobacco.            Physician Attestation   I, Randal Nice PA-C, was present with the medical/DAWIT student who participated in the service and in the documentation of the note.  I have verified the history and personally performed the physical exam and medical decision making.  I agree with the assessment and plan of care as documented in the note.      Items personally reviewed: vitals and labs.    KATI Kumar PA-C John Welter, PA-S  [facility]

## 2024-01-16 NOTE — COMMUNITY RESOURCES LIST (ENGLISH)
01/16/2024   [facility]  N/A  For questions about this resource list or additional care needs, please contact your primary care clinic or care manager.  Phone: [phone]   Email: N/A   Address: [address] [place] [id number]   Hours: N/A        Financial Stability       Utility payment assistance  1  [address] [place] [address] Distance: 5.47 miles      In-Person, Phone/Virtual   [address] [place] [address]  Language: English  Hours: Mon 8:00 AM - 4:00 PM , Tue 8:00 AM - 7:00 PM , Wed - Thu 8:00 AM - 4:00 PM  Fees: Free   Phone: (926) 137-6140 Email: info@[employer] Website: https://[employer]/resources/resource-centers/     2  Community [employer] ([facility]) [facility] - Energy Assistance Program (EAP) Distance: 5.74 miles      In-Person   [address] [place] [address]  Language: English, Tongan  Hours: Mon - Fri 8:00 AM - 4:30 PM  Fees: Free   Phone: (447) 245-9445 Email: info@[employer] Website: http://www.[employer].org          Important Numbers & Websites       Emergency Services   911  City Services   311  Poison Control   (972) 118-7330  Suicide Prevention Lifeline   (479) 240-3885 (TALK)  Child Abuse Hotline   (468) 330-6987 (4-A-Child)  Sexual Assault Hotline   (653) 255-6942 (HOPE)  National Runaway Safeline   (881) 100-6966 (RUNAWAY)  All-Options Talkline   (585) 997-8028  Substance Abuse Referral   (907) 963-7789 (HELP)

## 2024-01-17 ENCOUNTER — THERAPY VISIT (OUTPATIENT)
Dept: PHYSICAL THERAPY | Facility: CLINIC | Age: 54
End: 2024-01-17
Attending: PODIATRIST
Payer: COMMERCIAL

## 2024-01-17 DIAGNOSIS — M25.571 CHRONIC PAIN OF RIGHT ANKLE: Primary | ICD-10-CM

## 2024-01-17 DIAGNOSIS — G89.29 CHRONIC PAIN OF RIGHT ANKLE: Primary | ICD-10-CM

## 2024-01-17 PROCEDURE — 97530 THERAPEUTIC ACTIVITIES: CPT | Mod: GP | Performed by: PHYSICAL THERAPIST

## 2024-01-17 PROCEDURE — 97110 THERAPEUTIC EXERCISES: CPT | Mod: 59 | Performed by: PHYSICAL THERAPIST

## 2024-01-23 RX ORDER — LEVOTHYROXINE SODIUM 50 UG/1
50 TABLET ORAL DAILY
Qty: 90 TABLET | Refills: 2 | Status: SHIPPED | OUTPATIENT
Start: 2024-01-23

## 2024-01-24 ENCOUNTER — THERAPY VISIT (OUTPATIENT)
Dept: PHYSICAL THERAPY | Facility: CLINIC | Age: 54
End: 2024-01-24
Payer: COMMERCIAL

## 2024-01-24 DIAGNOSIS — G89.29 CHRONIC PAIN OF RIGHT ANKLE: Primary | ICD-10-CM

## 2024-01-24 DIAGNOSIS — M25.571 CHRONIC PAIN OF RIGHT ANKLE: Primary | ICD-10-CM

## 2024-01-24 PROCEDURE — 97110 THERAPEUTIC EXERCISES: CPT | Mod: GP | Performed by: PHYSICAL THERAPIST

## 2024-01-31 ENCOUNTER — THERAPY VISIT (OUTPATIENT)
Dept: PHYSICAL THERAPY | Facility: CLINIC | Age: 54
End: 2024-01-31
Payer: COMMERCIAL

## 2024-01-31 DIAGNOSIS — M25.571 CHRONIC PAIN OF RIGHT ANKLE: Primary | ICD-10-CM

## 2024-01-31 DIAGNOSIS — G89.29 CHRONIC PAIN OF RIGHT ANKLE: Primary | ICD-10-CM

## 2024-01-31 PROCEDURE — 97140 MANUAL THERAPY 1/> REGIONS: CPT | Mod: GP | Performed by: PHYSICAL THERAPIST

## 2024-01-31 PROCEDURE — 97110 THERAPEUTIC EXERCISES: CPT | Mod: GP | Performed by: PHYSICAL THERAPIST

## 2024-07-06 NOTE — TELEPHONE ENCOUNTER
S-(situation): abdominal pain/tenderness to the touch    B-(background): patient had appointment yesterday and doctor discovered tenderness and pulsation in abdomen    A-(assessment): rates abdominal pain 3 out of 10 on pain scale, located half inch to an inch above belly button, very tender to touch    Leg soreness every time patient get up, leg pain resolves after movement    Denies: nausea, vomiting, diarrhea, constipation, fever    R-(recommendations): scheduled appointment today 4/22/22 at [facility]    Chata Gardner RN        Reason for Disposition    Pain is mainly in upper abdomen (if needed ask: 'is it mainly above the belly button?')    Mild abdominal pain    Patient wants to be seen    Additional Information    Negative: Passed out (i.e., fainted, collapsed and was not responding)    Negative: Shock suspected (e.g., cold/pale/clammy skin, too weak to stand, low BP, rapid pulse)    Negative: Sounds like a life-threatening emergency to the triager    Negative: Chest pain    Negative: Chest pain    Negative: Passed out (i.e., fainted, collapsed and was not responding)    Negative: Shock suspected (e.g., cold/pale/clammy skin, too weak to stand, low BP, rapid pulse)    Negative: Visible sweat on face or sweat is dripping down    Negative: SEVERE abdominal pain (e.g., excruciating)    Negative: Pain lasting > 10 minutes and over 50 years old    Negative: Pain lasting > 10 minutes and over 40 years old and associated chest, arm, neck, upper back, or jaw pain    Negative: Pain lasting > 10 minutes and over 35 years old and at least one cardiac risk factor    Negative: Pain lasting > 10 minutes and history of heart disease (i.e., heart attack, bypass surgery, angina, angioplasty, CHF)    Negative: Recent injury to the abdomen    Negative: Vomiting red blood or black (coffee ground) material    Negative: Bloody, black, or tarry bowel movements (Exception: chronic-unchanged  black-grey bowel movements and  Your patient Nima Alegre (2018) was admitted at [facility], Inpatient Pediatrics Unit on 7/6/2024. The admitting physician is Makenzie Huang MD who can be reached via [employer]. Thank you!    "is taking iron pills or Pepto-bismol)    Negative: Pregnant > 24 weeks and hand or face swelling    Negative: Constant abdominal pain lasting > 2 hours    Negative: Vomiting bile (green color)    Negative: Patient sounds very sick or weak to the triager    Negative: Vomiting and abdomen looks much more swollen than usual    Negative: White of the eyes have turned yellow (i.e.,  jaundice)    Negative: Fever > 103 F (39.4 C)    Negative: Fever > 101 F (38.3 C) and over 60 years of age    Negative: Fever > 100.0 F (37.8 C) and has diabetes mellitus or a weak immune system (e.g., HIV positive, cancer chemotherapy, organ transplant, splenectomy, chronic steroids)    Negative: Fever > 100.0 F (37.8 C) and bedridden (e.g., nursing home patient, stroke, chronic illness, recovering from surgery)    Negative: Age > 60 years    Negative: Alcohol abuse known or suspected    Negative: MILD pain that comes and goes (cramps) lasts > 24 hours    Negative: Intermittent burning pains radiating into chest or sour taste in mouth    Negative: Abdominal pain is a chronic symptom (recurrent or ongoing AND lasting > 4 weeks)    Answer Assessment - Initial Assessment Questions  1. LOCATION: \"Where does it hurt?\"       Only if laying down and pushing it, located half inch to an inch above belly button  Tender to the touch,   2. RADIATION: \"Does the pain shoot anywhere else?\" (e.g., chest, back)      no  3. ONSET: \"When did the pain begin?\" (e.g., minutes, hours or days ago)       Didn;t notice until appt on 4/21/22 when doc discovered it  4. SUDDEN: \"Gradual or sudden onset?\"      n/a  5. PATTERN \"Does the pain come and go, or is it constant?\"     - If constant: \"Is it getting better, staying the same, or worsening?\"       (Note: Constant means the pain never goes away completely; most serious pain is constant and it progresses)      - If intermittent: \"How long does it last?\" \"Do you have pain now?\"      (Note: Intermittent means the pain " "goes away completely between bouts)      intermittent  6. SEVERITY: \"How bad is the pain?\"  (e.g., Scale 1-10; mild, moderate, or severe)    - MILD (1-3): doesn't interfere with normal activities, abdomen soft and not tender to touch     - MODERATE (4-7): interferes with normal activities or awakens from sleep, tender to touch     - SEVERE (8-10): excruciating pain, doubled over, unable to do any normal activities       Moderate- 3 on pain scale, tender to the touch  7. RECURRENT SYMPTOM: \"Have you ever had this type of abdominal pain before?\" If so, ask: \"When was the last time?\" and \"What happened that time?\"       no  8. CAUSE: \"What do you think is causing the abdominal pain?\"      possibly attributes some of it to obesity  9. RELIEVING/AGGRAVATING FACTORS: \"What makes it better or worse?\" (e.g., movement, antacids, bowel movement)      Movement improves leg soreness, only discomfort when pushes on abdomen  10. OTHER SYMPTOMS: \"Has there been any vomiting, diarrhea, constipation, or urine problems?\"        Legs unusually tight and sore until able to \"walk it off\", any time \"I get up the legs bothers me\",   denies nausea, vomiting, constipation  11. PREGNANCY: \"Is there any chance you are pregnant?\" \"When was your last menstrual period?\"        no    Protocols used: ABDOMINAL PAIN - FEMALE-A-[place], ABDOMINAL PAIN - UPPER-A-[place]      "

## 2024-09-16 ENCOUNTER — OFFICE VISIT (OUTPATIENT)
Dept: FAMILY MEDICINE | Facility: CLINIC | Age: 54
End: 2024-09-16
Payer: COMMERCIAL

## 2024-09-16 VITALS
SYSTOLIC BLOOD PRESSURE: 117 MMHG | TEMPERATURE: 97.7 F | DIASTOLIC BLOOD PRESSURE: 76 MMHG | BODY MASS INDEX: 28.3 KG/M2 | OXYGEN SATURATION: 100 % | RESPIRATION RATE: 14 BRPM | WEIGHT: 153.8 LBS | HEIGHT: 62 IN | HEART RATE: 63 BPM

## 2024-09-16 DIAGNOSIS — M35.3 POLYMYALGIA (H): ICD-10-CM

## 2024-09-16 DIAGNOSIS — R53.83 FATIGUE, UNSPECIFIED TYPE: Primary | ICD-10-CM

## 2024-09-16 DIAGNOSIS — D72.819 LEUKOPENIA, UNSPECIFIED TYPE: Primary | ICD-10-CM

## 2024-09-16 DIAGNOSIS — D72.819 LEUKOPENIA, UNSPECIFIED TYPE: ICD-10-CM

## 2024-09-16 DIAGNOSIS — M25.522 BILATERAL ELBOW JOINT PAIN: ICD-10-CM

## 2024-09-16 DIAGNOSIS — M25.521 BILATERAL ELBOW JOINT PAIN: ICD-10-CM

## 2024-09-16 LAB
CRP SERPL-MCNC: <3 MG/L
ERYTHROCYTE [DISTWIDTH] IN BLOOD BY AUTOMATED COUNT: 13.8 % (ref 10–15)
ERYTHROCYTE [SEDIMENTATION RATE] IN BLOOD BY WESTERGREN METHOD: 11 MM/HR (ref 0–30)
HBA1C MFR BLD: 5.2 % (ref 0–5.6)
HCT VFR BLD AUTO: 35 % (ref 35–47)
HGB BLD-MCNC: 11.6 G/DL (ref 11.7–15.7)
IRON BINDING CAPACITY (ROCHE): 321 UG/DL (ref 240–430)
IRON SATN MFR SERPL: 16 % (ref 15–46)
IRON SERPL-MCNC: 50 UG/DL (ref 37–145)
MCH RBC QN AUTO: 28.9 PG (ref 26.5–33)
MCHC RBC AUTO-ENTMCNC: 33.1 G/DL (ref 31.5–36.5)
MCV RBC AUTO: 87 FL (ref 78–100)
PLATELET # BLD AUTO: 233 10E3/UL (ref 150–450)
RBC # BLD AUTO: 4.01 10E6/UL (ref 3.8–5.2)
RHEUMATOID FACT SERPL-ACNC: <10 IU/ML
TSH SERPL DL<=0.005 MIU/L-ACNC: 2.43 UIU/ML (ref 0.3–4.2)
WBC # BLD AUTO: 3.6 10E3/UL (ref 4–11)

## 2024-09-16 PROCEDURE — 82607 VITAMIN B-12: CPT | Performed by: FAMILY MEDICINE

## 2024-09-16 PROCEDURE — 83036 HEMOGLOBIN GLYCOSYLATED A1C: CPT | Performed by: FAMILY MEDICINE

## 2024-09-16 PROCEDURE — 85652 RBC SED RATE AUTOMATED: CPT | Performed by: FAMILY MEDICINE

## 2024-09-16 PROCEDURE — 85025 COMPLETE CBC W/AUTO DIFF WBC: CPT | Performed by: FAMILY MEDICINE

## 2024-09-16 PROCEDURE — 84443 ASSAY THYROID STIM HORMONE: CPT | Performed by: FAMILY MEDICINE

## 2024-09-16 PROCEDURE — 86140 C-REACTIVE PROTEIN: CPT | Performed by: FAMILY MEDICINE

## 2024-09-16 PROCEDURE — 83550 IRON BINDING TEST: CPT | Performed by: FAMILY MEDICINE

## 2024-09-16 PROCEDURE — 36415 COLL VENOUS BLD VENIPUNCTURE: CPT | Performed by: FAMILY MEDICINE

## 2024-09-16 PROCEDURE — 83540 ASSAY OF IRON: CPT | Performed by: FAMILY MEDICINE

## 2024-09-16 PROCEDURE — 86431 RHEUMATOID FACTOR QUANT: CPT | Performed by: FAMILY MEDICINE

## 2024-09-16 PROCEDURE — G2211 COMPLEX E/M VISIT ADD ON: HCPCS | Performed by: FAMILY MEDICINE

## 2024-09-16 PROCEDURE — 99213 OFFICE O/P EST LOW 20 MIN: CPT | Performed by: FAMILY MEDICINE

## 2024-09-16 ASSESSMENT — PAIN SCALES - GENERAL: PAINLEVEL: MODERATE PAIN (4)

## 2024-09-16 NOTE — PROGRESS NOTES
"  Assessment & Plan     Fatigue, unspecified type  Will check labs today to evaluate for cause of fatigue, muscle aches, and joint pains.  Could be related to her thyroid, but also could be post covid fatigue.  Recommendations pending results of labs.  - TSH with free T4 reflex; Future  - CRP, inflammation; Future  - ESR: Erythrocyte sedimentation rate; Future  - CBC with platelets; Future  - Rheumatoid factor; Future  - Iron and iron binding capacity; Future  - Vitamin B12; Future  - Hemoglobin A1c; Future  - TSH with free T4 reflex  - CRP, inflammation  - ESR: Erythrocyte sedimentation rate  - CBC with platelets  - Rheumatoid factor  - Iron and iron binding capacity  - Vitamin B12  - Hemoglobin A1c    Polymyalgia (H24)  - CRP, inflammation; Future  - ESR: Erythrocyte sedimentation rate; Future  - Rheumatoid factor; Future  - CRP, inflammation  - ESR: Erythrocyte sedimentation rate  - Rheumatoid factor    Bilateral elbow joint pain  - CRP, inflammation; Future  - ESR: Erythrocyte sedimentation rate; Future  - Rheumatoid factor; Future  - CRP, inflammation  - ESR: Erythrocyte sedimentation rate  - Rheumatoid factor      The longitudinal plan of care for the diagnosis(es)/condition(s) as documented were addressed during this visit. Due to the added complexity in care, I will continue to support Fiona in the subsequent management and with ongoing continuity of care.    BMI  Estimated body mass index is 28.13 kg/m  as calculated from the following:    Height as of this encounter: 1.575 m (5' 2\").    Weight as of this encounter: 69.8 kg (153 lb 12.8 oz).       See Patient Instructions    Estelita Jaimes is a 53 year old, presenting for the following health issues:  Musculoskeletal Problem (Joint pain / body aches > 4 weeks)        9/16/2024    10:34 AM   Additional Questions   Roomed by Justina Do, EMT-B     Musculoskeletal Problem    History of Present Illness       Reason for visit:  Body aches and joint pain " on-going  Symptom onset:  More than a month   She is taking medications regularly.     Had COVID about a month ago, wiped her out, works from home, she usually feels well so for her to not feel well enough to work is very unusual. She is still very fatigued, does not have her energy back.  She has pains all throughout her body, things that normally would not tire her cause aches and exhaustion.  She just started taking ibuprofen, which is helpful for her.  Elbow pains are most prominent but has full body pains.  She has a history of PGM and paternal aunt with rheumatoid history.    She has been gluten free for about 2.5 years, no alcohol use.  Has thyroid problems, takes her levothyroxine daily, same dose for years. Takes a multivitamin daily.  Donated blood about one week ago, hemoglobin was normal.  Physically she has not noted any swelling, redness, warmth.  No accidents or injuries.  She does feel like her neck glands are full, but she has had this before.    Pain History:  When did you first notice your pain? 1+ month   Have you seen anyone else for your pain? No  How has your pain affected your ability to work? 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 Musculoskeletal problem/pain  Onset/Duration: 1+ month  Description  Location: joints - all over  Joint Swelling: No  Redness: No  Pain: YES  Warmth: No  Intensity:  moderate to severe  Progression of Symptoms:  same, constant, and waxing and waning  Accompanying signs and symptoms:   Fevers: YES- COVID 1 month ago, 3 days of fever. 103 highest / lasted 1 afternoon, no fever since  Numbness/tingling/weakness: YES- weakness  History  Trauma to the area: No  Recent illness:  YES- COVID 1 month ago  Previous similar problem: No  Previous evaluation:  No  Precipitating or alleviating factors:  Aggravating factors include: walking, climbing stairs, lifting, and  "overuse  Therapies tried and outcome: Ibuprofen, warm baths in the evenings          Current Outpatient Medications   Medication Sig Dispense Refill    levothyroxine (SYNTHROID/LEVOTHROID) 50 MCG tablet Take 1 tablet (50 mcg) by mouth daily 90 tablet 2    metroNIDAZOLE (METROLOTION) 0.75 % external lotion APPLY TO FACE TWICE A DAY      triamcinolone (KENALOG) 0.1 % external ointment Apply topically 2 times daily 30 g 1           Objective    /76 (BP Location: Right arm, Patient Position: Sitting, Cuff Size: Adult Regular)   Pulse 63   Temp 97.7  F (36.5  C) (Oral)   Resp 14   Ht 1.575 m (5' 2\")   Wt 69.8 kg (153 lb 12.8 oz)   LMP 08/12/2024 (Exact Date)   SpO2 100%   BMI 28.13 kg/m    Body mass index is 28.13 kg/m .  Physical Exam   GENERAL: alert and no distress  EYES: Eyes grossly normal to inspection, PERRL and conjunctivae and sclerae normal  HENT: ear canals and TM's normal, nose and mouth without ulcers or lesions  NECK: no adenopathy, no asymmetry, masses, or scars  RESP: lungs clear to auscultation - no rales, rhonchi or wheezes  CV: regular rate and rhythm, normal S1 S2, no S3 or S4, no murmur, click or rub, no peripheral edema  ABDOMEN: bowel sounds normal  MS: no gross musculoskeletal defects noted, no edema  SKIN: no suspicious lesions or rashes  NEURO: Normal strength and tone, mentation intact and speech normal  PSYCH: mentation appears normal, affect normal/bright          Signed Electronically by: Noemi Patrick MD    "

## 2024-09-17 LAB
BASOPHILS # BLD AUTO: 0 10E3/UL (ref 0–0.2)
BASOPHILS NFR BLD AUTO: 1 %
EOSINOPHIL # BLD AUTO: 0.1 10E3/UL (ref 0–0.7)
EOSINOPHIL NFR BLD AUTO: 2 %
IMM GRANULOCYTES # BLD: 0 10E3/UL
IMM GRANULOCYTES NFR BLD: 0 %
LYMPHOCYTES # BLD AUTO: 1.5 10E3/UL (ref 0.8–5.3)
LYMPHOCYTES NFR BLD AUTO: 43 %
MONOCYTES # BLD AUTO: 0.3 10E3/UL (ref 0–1.3)
MONOCYTES NFR BLD AUTO: 8 %
NEUTROPHILS # BLD AUTO: 1.7 10E3/UL (ref 1.6–8.3)
NEUTROPHILS NFR BLD AUTO: 46 %
WBC # BLD AUTO: 3.6 10E3/UL (ref 4–11)

## 2024-09-18 LAB — VIT B12 SERPL-MCNC: 1421 PG/ML (ref 232–1245)

## 2024-12-11 DIAGNOSIS — E03.8 SUBCLINICAL HYPOTHYROIDISM: ICD-10-CM

## 2024-12-11 RX ORDER — LEVOTHYROXINE SODIUM 50 UG/1
50 TABLET ORAL DAILY
Qty: 90 TABLET | Refills: 2 | OUTPATIENT
Start: 2024-12-11

## 2025-01-06 ENCOUNTER — ANCILLARY PROCEDURE (OUTPATIENT)
Dept: MAMMOGRAPHY | Facility: CLINIC | Age: 55
End: 2025-01-06
Attending: NURSE PRACTITIONER
Payer: COMMERCIAL

## 2025-01-06 DIAGNOSIS — Z12.31 VISIT FOR SCREENING MAMMOGRAM: ICD-10-CM

## 2025-01-06 PROCEDURE — 77067 SCR MAMMO BI INCL CAD: CPT | Mod: TC | Performed by: RADIOLOGY

## 2025-01-06 PROCEDURE — 77063 BREAST TOMOSYNTHESIS BI: CPT | Mod: TC | Performed by: RADIOLOGY

## 2025-01-24 ENCOUNTER — OFFICE VISIT (OUTPATIENT)
Dept: FAMILY MEDICINE | Facility: CLINIC | Age: 55
End: 2025-01-24
Attending: PHYSICIAN ASSISTANT
Payer: COMMERCIAL

## 2025-01-24 VITALS
DIASTOLIC BLOOD PRESSURE: 78 MMHG | RESPIRATION RATE: 15 BRPM | WEIGHT: 157 LBS | BODY MASS INDEX: 29.64 KG/M2 | SYSTOLIC BLOOD PRESSURE: 114 MMHG | TEMPERATURE: 97.6 F | HEART RATE: 72 BPM | HEIGHT: 61 IN | OXYGEN SATURATION: 98 %

## 2025-01-24 DIAGNOSIS — Z00.00 ROUTINE GENERAL MEDICAL EXAMINATION AT A HEALTH CARE FACILITY: Primary | ICD-10-CM

## 2025-01-24 DIAGNOSIS — E03.8 SUBCLINICAL HYPOTHYROIDISM: ICD-10-CM

## 2025-01-24 DIAGNOSIS — Z80.3 FAMILY HISTORY OF MALIGNANT NEOPLASM OF BREAST: ICD-10-CM

## 2025-01-24 PROCEDURE — 99396 PREV VISIT EST AGE 40-64: CPT | Performed by: GENERAL PRACTICE

## 2025-01-24 RX ORDER — LEVOTHYROXINE SODIUM 50 UG/1
50 TABLET ORAL DAILY
Qty: 90 TABLET | Refills: 3 | Status: SHIPPED | OUTPATIENT
Start: 2025-01-24

## 2025-01-24 SDOH — HEALTH STABILITY: PHYSICAL HEALTH: ON AVERAGE, HOW MANY MINUTES DO YOU ENGAGE IN EXERCISE AT THIS LEVEL?: 40 MIN

## 2025-01-24 SDOH — HEALTH STABILITY: PHYSICAL HEALTH: ON AVERAGE, HOW MANY DAYS PER WEEK DO YOU ENGAGE IN MODERATE TO STRENUOUS EXERCISE (LIKE A BRISK WALK)?: 2 DAYS

## 2025-01-24 ASSESSMENT — PAIN SCALES - GENERAL: PAINLEVEL_OUTOF10: NO PAIN (0)

## 2025-01-24 ASSESSMENT — SOCIAL DETERMINANTS OF HEALTH (SDOH): HOW OFTEN DO YOU GET TOGETHER WITH FRIENDS OR RELATIVES?: ONCE A WEEK

## 2025-01-24 NOTE — PROGRESS NOTES
"Preventive Care Visit  [facility] PHILLY Santoro MD, Internal Medicine  Jan 24, 2025      Assessment & Plan     Routine general medical examination at a health care facility  UTD with pap and CRC  UTD with mammogram    - Lipid panel reflex to direct LDL Fasting; Future  - Basic metabolic panel  (Ca, Cl, CO2, Creat, Gluc, K, Na, BUN); Future    Subclinical hypothyroidism    - levothyroxine (SYNTHROID/LEVOTHROID) 50 MCG tablet; Take 1 tablet (50 mcg) by mouth daily.  - TSH; Future    Family history of malignant neoplasm of breast    - Adult Oncology/Hematology  Referral; Future            BMI  Estimated body mass index is 29.66 kg/m  as calculated from the following:    Height as of this encounter: 1.549 m (5' 1\").    Weight as of this encounter: 71.2 kg (157 lb).       Counseling  Appropriate preventive services were addressed with this patient via screening, questionnaire, or discussion as appropriate for fall prevention, nutrition, physical activity, Tobacco-use cessation, social engagement, weight loss and cognition.  Checklist reviewing preventive services available has been given to the patient.  Reviewed patient's diet, addressing concerns and/or questions.   She is at risk for lack of exercise and has been provided with information to increase physical activity for the benefit of her well-being.           Subjective   Fiona is a 54 year old, presenting for the following:  Physical        1/24/2025     2:16 PM   Additional Questions   Roomed by Leena         1/24/2025     2:16 PM   Patient Reported Additional Medications   Patient reports taking the following new medications none          Physical Exam    Donates blood              Health Care Directive  Patient does not have a Health Care Directive:       1/24/2025   General Health   How would you rate your overall physical health? Good   Feel stress (tense, anxious, or unable to sleep) Not at all         1/24/2025   Nutrition "   Three or more servings of calcium each day? (!) NO   Diet: Gluten-free/reduced   How many servings of fruit and vegetables per day? (!) 2-3   How many sweetened beverages each day? 0-1         1/24/2025   Exercise   Days per week of moderate/strenous exercise 2 days   Average minutes spent exercising at this level 40 min   (!) EXERCISE CONCERN      1/24/2025   Social Factors   Frequency of gathering with friends or relatives Once a week   Worry food won't last until get money to buy more No   Food not last or not have enough money for food? No   Do you have housing? (Housing is defined as stable permanent housing and does not include staying ouside in a car, in a tent, in an abandoned building, in an overnight shelter, or couch-surfing.) Yes   Are you worried about losing your housing? No   Lack of transportation? No   Unable to get utilities (heat,electricity)? No         1/24/2025   Fall Risk   Fallen 2 or more times in the past year? No   Trouble with walking or balance? No          1/24/2025   Dental   Dentist two times every year? Yes         1/24/2025   TB Screening   Were you born outside of the [place]? No         Today's PHQ-2 Score:       1/24/2025     2:07 PM   PHQ-2 ( 1999 [employer])   Q1: Little interest or pleasure in doing things 0   Q2: Feeling down, depressed or hopeless 0   PHQ-2 Score 0    Q1: Little interest or pleasure in doing things Not at all   Q2: Feeling down, depressed or hopeless Not at all   PHQ-2 Score 0       Patient-reported           1/24/2025   Substance Use   Alcohol more than 3/day or more than 7/wk Not Applicable   Do you use any other substances recreationally? No     Social History     Tobacco Use    Smoking status: Never     Passive exposure: Never    Smokeless tobacco: Never   Vaping Use    Vaping status: Never Used   Substance Use Topics    Alcohol use: Not Currently     Alcohol/week: 2.0 standard drinks of alcohol     Types: 2 Glasses of wine per week    Drug use: No            "1/6/2025   LAST FHS-7 RESULTS   1st degree relative breast or ovarian cancer Yes   Any relative bilateral breast cancer No   Any male have breast cancer No   Any ONE woman have BOTH breast AND ovarian cancer No   Any woman with breast cancer before 50yrs No   2 or more relatives with breast AND/OR ovarian cancer No   2 or more relatives with breast AND/OR bowel cancer Yes                1/24/2025   STI Screening   New sexual partner(s) since last STI/HIV test? No     History of abnormal Pap smear:         Latest Ref Rng & Units 1/16/2024     7:27 AM 12/8/2022     9:22 AM 10/28/2021     7:14 AM   PAP / HPV   PAP  Negative for Intraepithelial Lesion or Malignancy (NILM)  Negative for Intraepithelial Lesion or Malignancy (NILM)  Negative for Intraepithelial Lesion or Malignancy (NILM)    HPV 16 DNA Negative Negative  Negative  Negative    HPV 18 DNA Negative Negative  Negative  Negative    Other HR HPV Negative Negative  Negative  Positive      ASCVD Risk   The 10-year ASCVD risk score (Gayla HANCOCK, et al., 2019) is: 0.9%    Values used to calculate the score:      Age: 54 years      Sex: Female      Is Non- : No      Diabetic: No      Tobacco smoker: No      Systolic Blood Pressure: 114 mmHg      Is BP treated: No      HDL Cholesterol: 73 mg/dL      Total Cholesterol: 194 mg/dL           Reviewed and updated as needed this visit by Provider                          Review of Systems  Constitutional, HEENT, cardiovascular, pulmonary, GI, , musculoskeletal, neuro, skin, endocrine and psych systems are negative, except as otherwise noted.     Objective    Exam  /78 (BP Location: Right arm, Patient Position: Sitting, Cuff Size: Adult Regular)   Pulse 72   Temp 97.6  F (36.4  C) (Oral)   Resp 15   Ht 1.549 m (5' 1\")   Wt 71.2 kg (157 lb)   LMP 12/11/2024 (Approximate)   SpO2 98%   BMI 29.66 kg/m     Estimated body mass index is 29.66 kg/m  as calculated from the following:    " "Height as of this encounter: 1.549 m (5' 1\").    Weight as of this encounter: 71.2 kg (157 lb).    Physical Exam  Constitutional:       Appearance: Normal appearance.   HENT:      Head: Normocephalic and atraumatic.      Right Ear: Tympanic membrane normal.      Left Ear: Tympanic membrane normal.      Nose: Nose normal.      Mouth/Throat:      Mouth: Mucous membranes are moist.      Pharynx: Oropharynx is clear.   Eyes:      Extraocular Movements: Extraocular movements intact.      Conjunctiva/sclera: Conjunctivae normal.   Cardiovascular:      Rate and Rhythm: Normal rate and regular rhythm.      Heart sounds: Normal heart sounds.   Pulmonary:      Effort: Pulmonary effort is normal.      Breath sounds: Normal breath sounds.   Abdominal:      General: Abdomen is flat.      Palpations: Abdomen is soft.   Musculoskeletal:         General: Normal range of motion.      Cervical back: Normal range of motion and neck supple.   Skin:     General: Skin is warm.   Neurological:      General: No focal deficit present.      Mental Status: She is alert and oriented to person, place, and time. Mental status is at baseline.   Psychiatric:         Mood and Affect: Mood and affect normal.         Speech: Speech normal.         Behavior: Behavior normal. Behavior is cooperative.         Thought Content: Thought content normal.         Cognition and Memory: Cognition normal.         Judgment: Judgment normal.               Signed Electronically by: Mayra Santoro MD    "

## 2025-01-24 NOTE — PATIENT INSTRUCTIONS
Patient Education   Preventive Care Advice   This is general advice given by our system to help you stay healthy. However, your care team may have specific advice just for you. Please talk to your care team about your preventive care needs.  Nutrition  Eat 5 or more servings of fruits and vegetables each day.  Try wheat bread, brown rice and whole grain pasta (instead of white bread, rice, and pasta).  Get enough calcium and vitamin D. Check the label on foods and aim for 100% of the RDA (recommended daily allowance).  Lifestyle  Exercise at least 150 minutes each week  (30 minutes a day, 5 days a week).  Do muscle strengthening activities 2 days a week. These help control your weight and prevent disease.  No smoking.  Wear sunscreen to prevent skin cancer.  Have a dental exam and cleaning every 6 months.  Yearly exams  See your health care team every year to talk about:  Any changes in your health.  Any medicines your care team has prescribed.  Preventive care, family planning, and ways to prevent chronic diseases.  Shots (vaccines)   HPV shots (up to age 26), if you've never had them before.  Hepatitis B shots (up to age 59), if you've never had them before.  COVID-19 shot: Get this shot when it's due.  Flu shot: Get a flu shot every year.  Tetanus shot: Get a tetanus shot every 10 years.  Pneumococcal, hepatitis A, and RSV shots: Ask your care team if you need these based on your risk.  Shingles shot (for age 50 and up)  General health tests  Diabetes screening:  Starting at age 35, Get screened for diabetes at least every 3 years.  If you are younger than age 35, ask your care team if you should be screened for diabetes.  Cholesterol test: At age 39, start having a cholesterol test every 5 years, or more often if advised.  Bone density scan (DEXA): At age 50, ask your care team if you should have this scan for osteoporosis (brittle bones).  Hepatitis C: Get tested at least once in your life.  STIs (sexually  transmitted infections)  Before age 24: Ask your care team if you should be screened for STIs.  After age 24: Get screened for STIs if you're at risk. You are at risk for STIs (including HIV) if:  You are sexually active with more than one person.  You don't use condoms every time.  You or a partner was diagnosed with a sexually transmitted infection.  If you are at risk for HIV, ask about PrEP medicine to prevent HIV.  Get tested for HIV at least once in your life, whether you are at risk for HIV or not.  Cancer screening tests  Cervical cancer screening: If you have a cervix, begin getting regular cervical cancer screening tests starting at age 21.  Breast cancer scan (mammogram): If you've ever had breasts, begin having regular mammograms starting at age 40. This is a scan to check for breast cancer.  Colon cancer screening: It is important to start screening for colon cancer at age 45.  Have a colonoscopy test every 10 years (or more often if you're at risk) Or, ask your provider about stool tests like a FIT test every year or Cologuard test every 3 years.  To learn more about your testing options, visit:   .  For help making a decision, visit:   https://bit.ly/jh60783.  Prostate cancer screening test: If you have a prostate, ask your care team if a prostate cancer screening test (PSA) at age 55 is right for you.  Lung cancer screening: If you are a current or former smoker ages 50 to 80, ask your care team if ongoing lung cancer screenings are right for you.  For informational purposes only. Not to replace the advice of your health care provider. Copyright   2023 [employer]. All rights reserved. Clinically reviewed by the [facility] Transitions Program. [employer] [id number] - REV 01/24.

## 2025-01-27 ENCOUNTER — PATIENT OUTREACH (OUTPATIENT)
Dept: ONCOLOGY | Facility: CLINIC | Age: 55
End: 2025-01-27
Payer: COMMERCIAL

## 2025-01-27 NOTE — PROGRESS NOTES
New Patient Oncology Nurse Navigator Note     Referring provider:     Mayra Santoro MD        Referring Clinic/Organization:     [facility]        Referred to (specialty:) Genetics     Requested provider (if applicable): NA     Date Referral Received: January 27, 2025     Evaluation for:  Z80.3 (ICD-10-CM) - Family history of malignant neoplasm of breast     Payor: [facility] / Plan: [facility] CHOICE / Product Type: Indemnity     January 27, 2025  Referral received and reviewed.   Sent to NPS to schedule.     Sara MONTEJON, RN, OCN  Oncology Nurse Navigator   [facility]  Cancer Care Service Line   New Patient Hem/Onc Scheduling / Referrals: [phone] (fax: [phone] )         30-May-2021

## (undated) DEVICE — CLIP HEMOSTASIS ASSURANCE W16 MM BX00711884

## (undated) DEVICE — ENDO SNARE EXACTO COLD 9MM LOOP 2.4MMX230CM 00711115

## (undated) DEVICE — KIT ENDO TURNOVER/PROCEDURE W/CLEAN A SCOPE LINERS 103888

## (undated) RX ORDER — FENTANYL CITRATE 50 UG/ML
INJECTION, SOLUTION INTRAMUSCULAR; INTRAVENOUS
Status: DISPENSED
Start: 2023-01-26

## (undated) RX ORDER — SIMETHICONE 40MG/0.6ML
SUSPENSION, DROPS(FINAL DOSAGE FORM)(ML) ORAL
Status: DISPENSED
Start: 2023-01-26